# Patient Record
Sex: FEMALE | URBAN - METROPOLITAN AREA
[De-identification: names, ages, dates, MRNs, and addresses within clinical notes are randomized per-mention and may not be internally consistent; named-entity substitution may affect disease eponyms.]

---

## 2017-03-24 ENCOUNTER — APPOINTMENT (RX ONLY)
Dept: URBAN - METROPOLITAN AREA CLINIC 156 | Facility: CLINIC | Age: 42
Setting detail: DERMATOLOGY
End: 2017-03-24

## 2017-03-24 VITALS
HEART RATE: 81 BPM | SYSTOLIC BLOOD PRESSURE: 119 MMHG | DIASTOLIC BLOOD PRESSURE: 76 MMHG | RESPIRATION RATE: 16 BRPM | HEIGHT: 64 IN | WEIGHT: 200 LBS

## 2017-03-24 DIAGNOSIS — D22 MELANOCYTIC NEVI: ICD-10-CM

## 2017-03-24 DIAGNOSIS — L57.0 ACTINIC KERATOSIS: ICD-10-CM

## 2017-03-24 PROBLEM — D22.5 MELANOCYTIC NEVI OF TRUNK: Status: ACTIVE | Noted: 2017-03-24

## 2017-03-24 PROCEDURE — ? COUNSELING

## 2017-03-24 PROCEDURE — ? PHOTODYNAMIC THERAPY COUNSELING

## 2017-03-24 PROCEDURE — 99214 OFFICE O/P EST MOD 30 MIN: CPT

## 2017-03-24 PROCEDURE — ? OTHER

## 2017-03-24 ASSESSMENT — LOCATION SIMPLE DESCRIPTION DERM
LOCATION SIMPLE: LEFT CHEEK
LOCATION SIMPLE: UPPER BACK

## 2017-03-24 ASSESSMENT — LOCATION DETAILED DESCRIPTION DERM
LOCATION DETAILED: LEFT INFERIOR CENTRAL MALAR CHEEK
LOCATION DETAILED: SUPERIOR THORACIC SPINE

## 2017-03-24 ASSESSMENT — LOCATION ZONE DERM
LOCATION ZONE: TRUNK
LOCATION ZONE: FACE

## 2017-03-24 NOTE — PROCEDURE: OTHER
Detail Level: Zone
Other (Free Text): Sent message to Debby Zheng to precertify patient for PDT\\nIf PDT is not covered, patient will call and will plan to do Picato 0.15% (one area of the face at a time)
Note Text (......Xxx Chief Complaint.): This diagnosis correlates with the

## 2017-04-14 ENCOUNTER — APPOINTMENT (RX ONLY)
Dept: URBAN - METROPOLITAN AREA CLINIC 156 | Facility: CLINIC | Age: 42
Setting detail: DERMATOLOGY
End: 2017-04-14

## 2017-04-14 DIAGNOSIS — L57.0 ACTINIC KERATOSIS: ICD-10-CM

## 2017-04-14 PROCEDURE — ? PDT: BLUE

## 2017-04-14 PROCEDURE — ? PULSED-DYE LASER

## 2017-04-14 PROCEDURE — 96567 PDT DSTR PRMLG LES SKN: CPT

## 2017-04-14 ASSESSMENT — LOCATION SIMPLE DESCRIPTION DERM
LOCATION SIMPLE: SUPERIOR FOREHEAD
LOCATION SIMPLE: LEFT CHEEK
LOCATION SIMPLE: RIGHT CHEEK
LOCATION SIMPLE: CHIN
LOCATION SIMPLE: NOSE

## 2017-04-14 ASSESSMENT — LOCATION ZONE DERM
LOCATION ZONE: NOSE
LOCATION ZONE: FACE

## 2017-04-14 ASSESSMENT — LOCATION DETAILED DESCRIPTION DERM
LOCATION DETAILED: RIGHT CENTRAL MALAR CHEEK
LOCATION DETAILED: LEFT INFERIOR CENTRAL MALAR CHEEK
LOCATION DETAILED: NASAL DORSUM
LOCATION DETAILED: LEFT CHIN
LOCATION DETAILED: SUPERIOR MID FOREHEAD

## 2017-04-14 NOTE — PROCEDURE: PULSED-DYE LASER
Fluence: 8
Post-Procedure Care: Vaseline and ice applied. Post care reviewed with patient.
Pulse Count (Optional): 12
Fluence: 10
Post-Care Instructions: I reviewed with the patient in detail post-care instructions. Patient should stay away from the sun and wear sun protection until treated areas are fully healed.
Price (Use Numbers Only, No Special Characters Or $): 0
Pulse Duration: 10 ms
Treated Area: medium area
Detail Level: Zone
Delay Time (Ms): 20
Cryogen Time (Ms): 30
Cryogen Time (Ms): 30
Laser Type: Vbeam 595nm
Delay Time (Ms): 20
Location Override: Face
Immediate Endpoint: erythema
Consent: Written consent obtained, risks reviewed including but not limited to crusting, scabbing, blistering, scarring, darker or lighter pigmentary change, incidental hair removal, bruising, and/or incomplete removal.
Spot Size: 7 mm
Spot Size: 10 mm
Pulse Duration: 20 ms

## 2017-04-14 NOTE — PROCEDURE: PDT: BLUE
Treatment Number: 1
Pre-Procedure Text: The treatment areas were cleaned and prepped in the usual fashion. Microdermabrasion and acetone performed during prep.
Expiration Date (Optional): 11/19
Was Levulan/Ameluz Applied On A Previous Day?: No
Anesthesia Type: 1% lidocaine with epinephrine
Which Photosensitizer Was Used: Levulan
Post-Care Instructions: I reviewed with the patient in detail post-care instructions. Patient is to avoid sunlight for the next 2 days, and wear sun protection. Patients may expect sunburn like redness, discomfort and scabbing.
Detail Level: Zone
Consent: Written consent obtained.  The risks were reviewed with the patient including but not limited to: pigmentary changes, pain, blistering, scabbing, redness, and the remote possibility of scarring.
Ndc# (Optional): [id number]
Lot # (Optional): [id number]
Light Source: Yaakov-U
Total Number Of Aks Treated (Optional To Report): 0
Incubation Start Time: 9:00 am
Illumination Time: 00:16:40
Incubation Time: 01:30:00
Incubation End Time: 10:30 am

## 2017-05-12 ENCOUNTER — APPOINTMENT (RX ONLY)
Dept: URBAN - METROPOLITAN AREA CLINIC 156 | Facility: CLINIC | Age: 42
Setting detail: DERMATOLOGY
End: 2017-05-12

## 2017-05-12 DIAGNOSIS — L57.0 ACTINIC KERATOSIS: ICD-10-CM

## 2017-05-12 PROCEDURE — 96567 PDT DSTR PRMLG LES SKN: CPT

## 2017-05-12 PROCEDURE — ? PDT: BLUE

## 2017-05-12 ASSESSMENT — LOCATION SIMPLE DESCRIPTION DERM
LOCATION SIMPLE: INFERIOR FOREHEAD
LOCATION SIMPLE: NOSE
LOCATION SIMPLE: RIGHT LIP
LOCATION SIMPLE: LEFT CHEEK
LOCATION SIMPLE: RIGHT CHEEK

## 2017-05-12 ASSESSMENT — LOCATION ZONE DERM
LOCATION ZONE: NOSE
LOCATION ZONE: LIP
LOCATION ZONE: FACE

## 2017-05-12 ASSESSMENT — LOCATION DETAILED DESCRIPTION DERM
LOCATION DETAILED: NASAL DORSUM
LOCATION DETAILED: INFERIOR MID FOREHEAD
LOCATION DETAILED: RIGHT MEDIAL MALAR CHEEK
LOCATION DETAILED: RIGHT LOWER CUTANEOUS LIP
LOCATION DETAILED: LEFT INFERIOR MEDIAL MALAR CHEEK

## 2017-05-12 NOTE — PROCEDURE: PDT: BLUE
Post-Care Instructions: I reviewed with the patient in detail post-care instructions. Patient is to avoid sunlight for the next 2 days, and wear sun protection. Patients may expect sunburn like redness, discomfort and scabbing.
Which Photosensitizer Was Used: Levulan
Light Source: Yaakov-U
Pre-Procedure Text: The treatment areas were cleaned and prepped in the usual fashion.
Expiration Date (Optional): 12/19
Illumination Time: 00:16:40
Lot # (Optional): [id number]
Consent: Written consent obtained.  The risks were reviewed with the patient including but not limited to: pigmentary changes, pain, blistering, scabbing, redness, and the remote possibility of scarring.
Detail Level: Zone
Incubation End Time: 10:15
Anesthesia Type: 1% lidocaine with epinephrine
Was Levulan/Ameluz Applied On A Previous Day?: No
Number Of Kerasticks/Tubes Billed For: 1
Treatment Number: 2
Total Number Of Aks Treated (Optional To Report): 0
Incubation Time: 01:30
Incubation Start Time: 8:45

## 2017-09-15 ENCOUNTER — APPOINTMENT (RX ONLY)
Dept: URBAN - METROPOLITAN AREA CLINIC 156 | Facility: CLINIC | Age: 42
Setting detail: DERMATOLOGY
End: 2017-09-15

## 2017-09-15 DIAGNOSIS — L82.1 OTHER SEBORRHEIC KERATOSIS: ICD-10-CM

## 2017-09-15 DIAGNOSIS — Z87.2 PERSONAL HISTORY OF DISEASES OF THE SKIN AND SUBCUTANEOUS TISSUE: ICD-10-CM | Status: RESOLVED

## 2017-09-15 DIAGNOSIS — Z71.89 OTHER SPECIFIED COUNSELING: ICD-10-CM

## 2017-09-15 DIAGNOSIS — D22 MELANOCYTIC NEVI: ICD-10-CM

## 2017-09-15 PROBLEM — D22.39 MELANOCYTIC NEVI OF OTHER PARTS OF FACE: Status: ACTIVE | Noted: 2017-09-15

## 2017-09-15 PROCEDURE — ? SUNSCREEN RECOMMENDATIONS

## 2017-09-15 PROCEDURE — ? COUNSELING

## 2017-09-15 PROCEDURE — 99213 OFFICE O/P EST LOW 20 MIN: CPT

## 2017-09-15 ASSESSMENT — LOCATION DETAILED DESCRIPTION DERM
LOCATION DETAILED: RIGHT INFERIOR CENTRAL MALAR CHEEK
LOCATION DETAILED: RIGHT CENTRAL MALAR CHEEK
LOCATION DETAILED: LEFT VENTRAL PROXIMAL FOREARM
LOCATION DETAILED: LEFT INFERIOR CENTRAL MALAR CHEEK
LOCATION DETAILED: RIGHT VENTRAL PROXIMAL FOREARM

## 2017-09-15 ASSESSMENT — LOCATION SIMPLE DESCRIPTION DERM
LOCATION SIMPLE: RIGHT FOREARM
LOCATION SIMPLE: RIGHT CHEEK
LOCATION SIMPLE: LEFT CHEEK
LOCATION SIMPLE: LEFT FOREARM

## 2017-09-15 ASSESSMENT — LOCATION ZONE DERM
LOCATION ZONE: ARM
LOCATION ZONE: FACE

## 2020-11-09 ENCOUNTER — APPOINTMENT (RX ONLY)
Dept: URBAN - METROPOLITAN AREA CLINIC 156 | Facility: CLINIC | Age: 45
Setting detail: DERMATOLOGY
End: 2020-11-09

## 2020-11-09 DIAGNOSIS — L57.0 ACTINIC KERATOSIS: ICD-10-CM | Status: INADEQUATELY CONTROLLED

## 2020-11-09 DIAGNOSIS — D22 MELANOCYTIC NEVI: ICD-10-CM

## 2020-11-09 DIAGNOSIS — L57.8 OTHER SKIN CHANGES DUE TO CHRONIC EXPOSURE TO NONIONIZING RADIATION: ICD-10-CM

## 2020-11-09 DIAGNOSIS — Z71.89 OTHER SPECIFIED COUNSELING: ICD-10-CM

## 2020-11-09 DIAGNOSIS — I78.8 OTHER DISEASES OF CAPILLARIES: ICD-10-CM | Status: STABLE

## 2020-11-09 DIAGNOSIS — L82.0 INFLAMED SEBORRHEIC KERATOSIS: ICD-10-CM

## 2020-11-09 DIAGNOSIS — L82.1 OTHER SEBORRHEIC KERATOSIS: ICD-10-CM

## 2020-11-09 PROBLEM — D22.5 MELANOCYTIC NEVI OF TRUNK: Status: ACTIVE | Noted: 2020-11-09

## 2020-11-09 PROCEDURE — ? COUNSELING

## 2020-11-09 PROCEDURE — 17000 DESTRUCT PREMALG LESION: CPT | Mod: 59

## 2020-11-09 PROCEDURE — 17110 DESTRUCTION B9 LES UP TO 14: CPT

## 2020-11-09 PROCEDURE — 99203 OFFICE O/P NEW LOW 30 MIN: CPT | Mod: 25

## 2020-11-09 PROCEDURE — 17003 DESTRUCT PREMALG LES 2-14: CPT | Mod: 59

## 2020-11-09 PROCEDURE — ? PRESCRIPTION

## 2020-11-09 PROCEDURE — ? SUNSCREEN RECOMMENDATIONS

## 2020-11-09 PROCEDURE — ? LIQUID NITROGEN

## 2020-11-09 RX ORDER — INGENOL MEBUTATE 150 UG/G
GEL TOPICAL
Qty: 1 | Refills: 1 | Status: ERX | COMMUNITY
Start: 2020-11-09

## 2020-11-09 RX ADMIN — INGENOL MEBUTATE: 150 GEL TOPICAL at 00:00

## 2020-11-09 ASSESSMENT — LOCATION SIMPLE DESCRIPTION DERM
LOCATION SIMPLE: CHEST
LOCATION SIMPLE: RIGHT HAND
LOCATION SIMPLE: RIGHT UPPER BACK
LOCATION SIMPLE: LEFT UPPER BACK
LOCATION SIMPLE: LEFT PRETIBIAL REGION
LOCATION SIMPLE: LEFT CHEEK

## 2020-11-09 ASSESSMENT — LOCATION ZONE DERM
LOCATION ZONE: FACE
LOCATION ZONE: TRUNK
LOCATION ZONE: HAND
LOCATION ZONE: LEG

## 2020-11-09 ASSESSMENT — LOCATION DETAILED DESCRIPTION DERM
LOCATION DETAILED: LEFT INFERIOR MEDIAL MALAR CHEEK
LOCATION DETAILED: MIDDLE STERNUM
LOCATION DETAILED: RIGHT MID-UPPER BACK
LOCATION DETAILED: RIGHT RADIAL DORSAL HAND
LOCATION DETAILED: RIGHT LATERAL SUPERIOR CHEST
LOCATION DETAILED: LEFT LATERAL PROXIMAL PRETIBIAL REGION
LOCATION DETAILED: LEFT MEDIAL UPPER BACK
LOCATION DETAILED: RIGHT MEDIAL SUPERIOR CHEST

## 2021-07-15 ENCOUNTER — APPOINTMENT (RX ONLY)
Dept: URBAN - METROPOLITAN AREA CLINIC 154 | Facility: CLINIC | Age: 46
Setting detail: DERMATOLOGY
End: 2021-07-15

## 2021-07-15 DIAGNOSIS — Z41.9 ENCOUNTER FOR PROCEDURE FOR PURPOSES OTHER THAN REMEDYING HEALTH STATE, UNSPECIFIED: ICD-10-CM

## 2021-07-15 PROCEDURE — ? BOTOX

## 2021-07-15 PROCEDURE — ? HYDRAFACIAL

## 2021-07-15 ASSESSMENT — LOCATION ZONE DERM: LOCATION ZONE: FACE

## 2021-07-15 ASSESSMENT — LOCATION DETAILED DESCRIPTION DERM: LOCATION DETAILED: INFERIOR MID FOREHEAD

## 2021-07-15 ASSESSMENT — LOCATION SIMPLE DESCRIPTION DERM: LOCATION SIMPLE: INFERIOR FOREHEAD

## 2021-07-15 NOTE — PROCEDURE: BOTOX
Glabellar Complex Units: 20
Show Topical Anesthesia: Yes
Additional Area 3 Units: 0
Show Right And Left Brow Units: No
Expiration Date (Month Year): 8/24
Detail Level: Simple
Inferior Lateral Orbicularis Oculi Units: 15
Lot #: [id number]
Consent: Written consent obtained. Risks include but not limited to lid/brow ptosis, bruising, swelling, diplopia, temporary effect, incomplete chemical denervation.
Forehead Units: 10
Dilution (U/0.1 Cc): 2.5
Additional Area 1 Location: [facility]
Reconstitution Date (Optional): 7/13/21
Post-Care Instructions: Patient instructed to not lie down for 4 hours and limit physical activity for 24 hours. Patient instructed not to travel by airplane for 48 hours.

## 2021-07-15 NOTE — PROCEDURE: HYDRAFACIAL
Additional Vacuum Pressure (Won't Render If 0): 22
Number Of Passes: 2
Detail Level: Zone
Treatment Number: 1
Vacuum Pressure: 12
Consent: Written consent obtained, risks reviewed including but not limited to crusting, scabbing, blistering, scarring, darker or lighter pigmentary change, bruising, and/or incomplete response.
Indication: anti-aging
Additional Vacuum Pressure (Won't Render If 0): 17
Glycolic Acid %: 15%
Post-Care Instructions: I reviewed with the patient in detail post-care instructions. Patient should stay away from the sun and wear sun protection until treated areas are fully healed.

## 2021-10-05 ENCOUNTER — APPOINTMENT (RX ONLY)
Dept: URBAN - METROPOLITAN AREA CLINIC 154 | Facility: CLINIC | Age: 46
Setting detail: DERMATOLOGY
End: 2021-10-05

## 2021-10-07 ENCOUNTER — APPOINTMENT (RX ONLY)
Dept: URBAN - METROPOLITAN AREA CLINIC 154 | Facility: CLINIC | Age: 46
Setting detail: DERMATOLOGY
End: 2021-10-07

## 2021-10-07 DIAGNOSIS — Z41.9 ENCOUNTER FOR PROCEDURE FOR PURPOSES OTHER THAN REMEDYING HEALTH STATE, UNSPECIFIED: ICD-10-CM

## 2021-10-07 PROCEDURE — ? HYDRAFACIAL

## 2021-10-07 PROCEDURE — ? DERMAPLANE

## 2021-10-07 PROCEDURE — ? JEUVEAU

## 2021-10-07 ASSESSMENT — LOCATION DETAILED DESCRIPTION DERM
LOCATION DETAILED: LEFT INFERIOR CENTRAL MALAR CHEEK
LOCATION DETAILED: RIGHT SUPERIOR LATERAL NECK
LOCATION DETAILED: RIGHT INFERIOR CENTRAL MALAR CHEEK
LOCATION DETAILED: LEFT SUPERIOR LATERAL NECK

## 2021-10-07 ASSESSMENT — LOCATION SIMPLE DESCRIPTION DERM
LOCATION SIMPLE: RIGHT ANTERIOR NECK
LOCATION SIMPLE: LEFT CHEEK
LOCATION SIMPLE: RIGHT CHEEK
LOCATION SIMPLE: LEFT ANTERIOR NECK

## 2021-10-07 ASSESSMENT — LOCATION ZONE DERM
LOCATION ZONE: FACE
LOCATION ZONE: NECK

## 2021-10-07 NOTE — PROCEDURE: HYDRAFACIAL
Additional Vacuum Pressure (Won't Render If 0): 16
Consent: Written consent obtained, risks reviewed including but not limited to crusting, scabbing, blistering, scarring, darker or lighter pigmentary change, bruising, and/or incomplete response.
Detail Level: Zone
Indication: anti-aging
Vacuum Pressure: 18
Post-Care Instructions: I reviewed with the patient in detail post-care instructions. Patient should stay away from the sun and wear sun protection until treated areas are fully healed.
Additional Vacuum Pressure (Won't Render If 0): 12
Number Of Passes: 3
Glycolic Acid %: 7.5%

## 2021-10-07 NOTE — PROCEDURE: JEUVEAU
Show Additional Area 5: Yes
R Brow Units: 0
Glabellar Complex Units: 20
Masseter Units: 30
Show Right And Left Brow Units: No
Inferior Lateral Orbicularis Oculi Units: 15
Detail Level: Detailed
Consent: Written consent obtained. Risks include but not limited to lid/brow ptosis, bruising, swelling, diplopia, temporary effect, incomplete chemical denervation.
Dilution (U/0.1 Cc): 4
Post-Care Instructions: Patient instructed to not lie down for 4 hours and limit physical activity for 24 hours.

## 2021-10-07 NOTE — PROCEDURE: DERMAPLANE
Pre-Procedure Text: The patient was placed in a recumbant position on the procedure table.
Blade: 10 blade scalpel
Treatment Area Prep: acetone
Treatment Areas: face and neck
Detail Level: Zone
Post-Procedure Instructions: Following the dermaplane procedure, Moisturizer and SPF was applied.
Post-Care Instructions: I reviewed with the patient in detail post-care instructions.

## 2021-11-03 ENCOUNTER — APPOINTMENT (RX ONLY)
Dept: URBAN - METROPOLITAN AREA CLINIC 154 | Facility: CLINIC | Age: 46
Setting detail: DERMATOLOGY
End: 2021-11-03

## 2021-11-03 DIAGNOSIS — Z41.9 ENCOUNTER FOR PROCEDURE FOR PURPOSES OTHER THAN REMEDYING HEALTH STATE, UNSPECIFIED: ICD-10-CM

## 2021-11-03 PROCEDURE — ? HYDRAFACIAL

## 2021-11-03 PROCEDURE — ? DERMAPLANE

## 2021-11-03 ASSESSMENT — LOCATION ZONE DERM
LOCATION ZONE: FACE
LOCATION ZONE: NECK

## 2021-11-03 ASSESSMENT — LOCATION DETAILED DESCRIPTION DERM
LOCATION DETAILED: RIGHT INFERIOR MEDIAL MALAR CHEEK
LOCATION DETAILED: RIGHT SUPERIOR LATERAL NECK
LOCATION DETAILED: LEFT SUPERIOR LATERAL NECK
LOCATION DETAILED: LEFT INFERIOR CENTRAL MALAR CHEEK

## 2021-11-03 ASSESSMENT — LOCATION SIMPLE DESCRIPTION DERM
LOCATION SIMPLE: RIGHT ANTERIOR NECK
LOCATION SIMPLE: RIGHT CHEEK
LOCATION SIMPLE: LEFT ANTERIOR NECK
LOCATION SIMPLE: LEFT CHEEK

## 2021-11-03 NOTE — PROCEDURE: HYDRAFACIAL
Glycolic Acid %: 7.5%
Post-Care Instructions: I reviewed with the patient in detail post-care instructions. Patient should stay away from the sun and wear sun protection until treated areas are fully healed.
Vacuum Pressure: 18
Additional Vacuum Pressure (Won't Render If 0): 16
Consent: Written consent obtained, risks reviewed including but not limited to crusting, scabbing, blistering, scarring, darker or lighter pigmentary change, bruising, and/or incomplete response.
Additional Vacuum Pressure (Won't Render If 0): 13
Indication: anti-aging
Detail Level: Zone
Number Of Passes: 3

## 2021-11-03 NOTE — PROCEDURE: DERMAPLANE
Detail Level: Zone
Pre-Procedure Text: The patient was placed in a recumbant position on the procedure table.
Treatment Areas: face
Post-Procedure Instructions: Following the dermaplane procedure, Moisturizer and SPF was applied.
Blade: 10 blade scalpel
Treatment Area Prep: acetone
Post-Care Instructions: I reviewed with the patient in detail post-care instructions.

## 2021-11-24 ENCOUNTER — APPOINTMENT (RX ONLY)
Dept: URBAN - METROPOLITAN AREA CLINIC 154 | Facility: CLINIC | Age: 46
Setting detail: DERMATOLOGY
End: 2021-11-24

## 2021-11-24 DIAGNOSIS — Z41.9 ENCOUNTER FOR PROCEDURE FOR PURPOSES OTHER THAN REMEDYING HEALTH STATE, UNSPECIFIED: ICD-10-CM

## 2021-11-24 PROCEDURE — ? DERMAPLANE

## 2021-11-24 PROCEDURE — ? HYDRAFACIAL

## 2021-11-24 ASSESSMENT — LOCATION ZONE DERM
LOCATION ZONE: NECK
LOCATION ZONE: FACE

## 2021-11-24 ASSESSMENT — LOCATION SIMPLE DESCRIPTION DERM
LOCATION SIMPLE: LEFT CHEEK
LOCATION SIMPLE: RIGHT ANTERIOR NECK
LOCATION SIMPLE: RIGHT CHEEK
LOCATION SIMPLE: LEFT ANTERIOR NECK

## 2021-11-24 NOTE — PROCEDURE: HYDRAFACIAL
Indication: anti-aging
Tip: Hydropeel Tip, Blue
Vacuum Pressure Low Setting (Will Not Render If Set To 0): 18
Tip: Hydropeel Tip, Teal
Vacuum Pressure Low Setting (Will Not Render If Set To 0): 15
Vacuum Pressure High Setting (Will Not Render If Set To 0): 0
Consent: Written consent obtained, risks reviewed including but not limited to crusting, scabbing, blistering, scarring, darker or lighter pigmentary change, bruising, and/or incomplete response.
Solution Override
Procedure: Exfoliation
Post-Care Instructions: I reviewed with the patient in detail post-care instructions. Patient should stay away from the sun and wear sun protection until treated areas are fully healed.
Tip: Hydropeel Tip, Clear
Location: other
Procedure: Fusion
Solution: Beta-HD
Solution: Activ-4
Procedure: Peel
Location Override: face & neck
Tip Override
Vacuum Pressure Low Setting (Will Not Render If Set To 0): 12
Solution: GlySal 7.5%
Procedure: Extraction

## 2021-11-24 NOTE — PROCEDURE: DERMAPLANE
Post-Care Instructions: I reviewed with the patient in detail post-care instructions.
Blade: 10 blade scalpel
Treatment Area Prep: acetone
Pre-Procedure Text: The patient was placed in a recumbant position on the procedure table.
Detail Level: Zone
Post-Procedure Instructions: Following the dermaplane procedure, Moisturizer and SPF was applied.
Treatment Areas: face and neck

## 2021-12-09 ENCOUNTER — APPOINTMENT (RX ONLY)
Dept: URBAN - METROPOLITAN AREA CLINIC 154 | Facility: CLINIC | Age: 46
Setting detail: DERMATOLOGY
End: 2021-12-09

## 2021-12-09 DIAGNOSIS — Z41.9 ENCOUNTER FOR PROCEDURE FOR PURPOSES OTHER THAN REMEDYING HEALTH STATE, UNSPECIFIED: ICD-10-CM

## 2021-12-09 PROCEDURE — ? HYDRAFACIAL

## 2021-12-09 PROCEDURE — ? DERMAPLANE

## 2021-12-09 ASSESSMENT — LOCATION SIMPLE DESCRIPTION DERM
LOCATION SIMPLE: LEFT CHEEK
LOCATION SIMPLE: LEFT ANTERIOR NECK
LOCATION SIMPLE: RIGHT ANTERIOR NECK
LOCATION SIMPLE: RIGHT CHEEK

## 2021-12-09 ASSESSMENT — LOCATION DETAILED DESCRIPTION DERM
LOCATION DETAILED: RIGHT SUPERIOR LATERAL NECK
LOCATION DETAILED: LEFT INFERIOR CENTRAL MALAR CHEEK
LOCATION DETAILED: LEFT SUPERIOR LATERAL NECK
LOCATION DETAILED: RIGHT INFERIOR CENTRAL MALAR CHEEK

## 2021-12-09 ASSESSMENT — LOCATION ZONE DERM
LOCATION ZONE: NECK
LOCATION ZONE: FACE

## 2021-12-09 NOTE — PROCEDURE: HYDRAFACIAL
Vacuum Pressure High Setting (Will Not Render If Set To 0): 0
Solution Override
Vacuum Pressure Low Setting (Will Not Render If Set To 0): 12
Location: other
Procedure: Peel
Tip: Hydropeel Tip, Teal
Consent: Written consent obtained, risks reviewed including but not limited to crusting, scabbing, blistering, scarring, darker or lighter pigmentary change, bruising, and/or incomplete response.
Tip Override
Solution: Activ-4
Tip: Hydropeel Tip, Clear
Post-Care Instructions: I reviewed with the patient in detail post-care instructions. Patient should stay away from the sun and wear sun protection until treated areas are fully healed.
Vacuum Pressure Low Setting (Will Not Render If Set To 0): 15
Procedure: Extraction
Location Override: face & neck
Solution: GlySal 7.5%
Vacuum Pressure Low Setting (Will Not Render If Set To 0): 18
Solution: Beta-HD
Tip: Hydropeel Tip, Blue
Procedure: Fusion
Indication: anti-aging
Number Of Passes: 2
Procedure: Exfoliation

## 2021-12-09 NOTE — PROCEDURE: DERMAPLANE
Post-Care Instructions: I reviewed with the patient in detail post-care instructions.
Detail Level: Zone
Post-Procedure Instructions: Following the dermaplane procedure, Moisturizer and SPF was applied.
Treatment Areas: face and neck
Pre-Procedure Text: The patient was placed in a recumbant position on the procedure table.
Treatment Area Prep: acetone
Blade: 10 blade scalpel

## 2021-12-16 ENCOUNTER — APPOINTMENT (RX ONLY)
Dept: URBAN - METROPOLITAN AREA CLINIC 154 | Facility: CLINIC | Age: 46
Setting detail: DERMATOLOGY
End: 2021-12-16

## 2021-12-16 DIAGNOSIS — Z41.9 ENCOUNTER FOR PROCEDURE FOR PURPOSES OTHER THAN REMEDYING HEALTH STATE, UNSPECIFIED: ICD-10-CM

## 2021-12-16 PROCEDURE — ? CHEMICAL PEEL

## 2021-12-16 ASSESSMENT — LOCATION ZONE DERM: LOCATION ZONE: FACE

## 2021-12-16 ASSESSMENT — LOCATION SIMPLE DESCRIPTION DERM: LOCATION SIMPLE: RIGHT CHEEK

## 2021-12-16 ASSESSMENT — LOCATION DETAILED DESCRIPTION DERM: LOCATION DETAILED: RIGHT INFERIOR MEDIAL MALAR CHEEK

## 2021-12-16 NOTE — PROCEDURE: CHEMICAL PEEL
Consent: Prior to the procedure, written consent was obtained and risks were reviewed, including but not limited to: redness, peeling, blistering, pigmentary change, scarring, infection, and pain.
Number Of Layers: 3
Chemical Peel: TCA 33%
Detail Level: Zone
Post Peel Care: After the procedure, a post-peel cream was applied to the treated areas. Sun protection and post-care instructions were reviewed with the patient.
Treatment Time (Optional): 10
Treatment Number: 0
Post-Care Instructions: I reviewed with the patient in detail post-care instructions. Patient should avoid sun exposure and wear sun protection.
Prep: The treated area was degreased with pre-peel cleanser, and vaseline was applied for protection of mucous membranes.

## 2021-12-21 ENCOUNTER — APPOINTMENT (RX ONLY)
Dept: URBAN - METROPOLITAN AREA CLINIC 154 | Facility: CLINIC | Age: 46
Setting detail: DERMATOLOGY
End: 2021-12-21

## 2021-12-21 DIAGNOSIS — Z41.9 ENCOUNTER FOR PROCEDURE FOR PURPOSES OTHER THAN REMEDYING HEALTH STATE, UNSPECIFIED: ICD-10-CM

## 2021-12-21 PROCEDURE — ? BOTOX

## 2021-12-21 PROCEDURE — ? CHEMICAL PEEL

## 2021-12-21 ASSESSMENT — LOCATION ZONE DERM: LOCATION ZONE: FACE

## 2021-12-21 ASSESSMENT — LOCATION SIMPLE DESCRIPTION DERM: LOCATION SIMPLE: LEFT CHEEK

## 2021-12-21 ASSESSMENT — LOCATION DETAILED DESCRIPTION DERM: LOCATION DETAILED: LEFT INFERIOR MEDIAL MALAR CHEEK

## 2021-12-21 NOTE — PROCEDURE: CHEMICAL PEEL
Consent: Prior to the procedure, written consent was obtained and risks were reviewed, including but not limited to: redness, peeling, blistering, pigmentary change, scarring, infection, and pain.
Post Peel Care: After the procedure, a post-peel cream was applied to the treated areas. Sun protection and post-care instructions were reviewed with the patient.
Treatment Number: 0
Detail Level: Zone
Treatment Time (Optional): 10
Chemical Peel: TCA 10%
Number Of Layers: 3
Post-Care Instructions: I reviewed with the patient in detail post-care instructions. Patient should avoid sun exposure and wear sun protection.
Prep: The treated area was degreased with pre-peel cleanser, and vaseline was applied for protection of mucous membranes.

## 2021-12-21 NOTE — PROCEDURE: BOTOX
Show Additional Area 1: Yes
Inferior Lateral Orbicularis Oculi Units: 15
Periorbital Skin Units: 0
Lot #: [id number]
Expiration Date (Month Year): 06/23
Consent: Written consent obtained. Risks include but not limited to lid/brow ptosis, bruising, swelling, diplopia, temporary effect, incomplete chemical denervation.
Detail Level: Detailed
Show Right And Left Periorbital Units: No
Masseter Units: 22
Additional Area 1 Location: [facility]
Forehead Units: 5
Dilution (U/0.1 Cc): 4
Post-Care Instructions: Patient instructed to not lie down for 4 hours and limit physical activity for 24 hours.

## 2021-12-28 ENCOUNTER — APPOINTMENT (RX ONLY)
Dept: URBAN - METROPOLITAN AREA CLINIC 154 | Facility: CLINIC | Age: 46
Setting detail: DERMATOLOGY
End: 2021-12-28

## 2021-12-28 DIAGNOSIS — Z41.9 ENCOUNTER FOR PROCEDURE FOR PURPOSES OTHER THAN REMEDYING HEALTH STATE, UNSPECIFIED: ICD-10-CM

## 2021-12-28 PROCEDURE — ? CHEMICAL PEEL

## 2021-12-28 ASSESSMENT — LOCATION SIMPLE DESCRIPTION DERM: LOCATION SIMPLE: LEFT CHEEK

## 2021-12-28 ASSESSMENT — LOCATION ZONE DERM: LOCATION ZONE: FACE

## 2021-12-28 ASSESSMENT — LOCATION DETAILED DESCRIPTION DERM: LOCATION DETAILED: LEFT INFERIOR MEDIAL MALAR CHEEK

## 2021-12-28 NOTE — PROCEDURE: CHEMICAL PEEL
Treatment Time (Optional): 10
Consent: Prior to the procedure, written consent was obtained and risks were reviewed, including but not limited to: redness, peeling, blistering, pigmentary change, scarring, infection, and pain.
Chemical Peel: Skin Medica Rejuvenize
Prep: The treated area was degreased with pre-peel cleanser, and vaseline was applied for protection of mucous membranes.
Number Of Layers: 2
Post-Care Instructions: I reviewed with the patient in detail post-care instructions. Patient should avoid sun exposure and wear sun protection.
Post Peel Care: After the procedure, a post-peel cream was applied to the treated areas. Sun protection and post-care instructions were reviewed with the patient.
Treatment Number: 0
Detail Level: Zone

## 2022-01-04 ENCOUNTER — APPOINTMENT (RX ONLY)
Dept: URBAN - METROPOLITAN AREA CLINIC 154 | Facility: CLINIC | Age: 47
Setting detail: DERMATOLOGY
End: 2022-01-04

## 2022-01-04 VITALS
SYSTOLIC BLOOD PRESSURE: 120 MMHG | DIASTOLIC BLOOD PRESSURE: 90 MMHG | HEIGHT: 64 IN | WEIGHT: 189 LBS | HEART RATE: 75 BPM | OXYGEN SATURATION: 99 %

## 2022-01-04 DIAGNOSIS — Z41.9 ENCOUNTER FOR PROCEDURE FOR PURPOSES OTHER THAN REMEDYING HEALTH STATE, UNSPECIFIED: ICD-10-CM

## 2022-01-04 PROCEDURE — ? OTHER

## 2022-01-05 ENCOUNTER — APPOINTMENT (RX ONLY)
Dept: URBAN - METROPOLITAN AREA CLINIC 154 | Facility: CLINIC | Age: 47
Setting detail: DERMATOLOGY
End: 2022-01-05

## 2022-01-05 DIAGNOSIS — Z41.9 ENCOUNTER FOR PROCEDURE FOR PURPOSES OTHER THAN REMEDYING HEALTH STATE, UNSPECIFIED: ICD-10-CM

## 2022-01-05 PROCEDURE — ? CHEMICAL PEEL

## 2022-01-05 NOTE — PROCEDURE: CHEMICAL PEEL
Prep: The treated area was degreased with pre-peel cleanser, and vaseline was applied for protection of mucous membranes.
Post Peel Care: After the procedure, a post-peel cream was applied to the treated areas. Sun protection and post-care instructions were reviewed with the patient.
Treatment Number: 0
Treatment Time (Optional): 10
Chemical Peel: Vi
Detail Level: Zone
Number Of Layers: 2
Consent: Prior to the procedure, written consent was obtained and risks were reviewed, including but not limited to: redness, peeling, blistering, pigmentary change, scarring, infection, and pain.
Post-Care Instructions: I reviewed with the patient in detail post-care instructions. Patient should avoid sun exposure and wear sun protection.

## 2022-01-06 ENCOUNTER — RX ONLY (OUTPATIENT)
Age: 47
Setting detail: RX ONLY
End: 2022-01-06

## 2022-01-06 RX ORDER — LEVOTHYROXINE, LIOTHYRONINE 38; 9 UG/1; UG/1
TABLET ORAL
Qty: 60 | Refills: 0 | Status: ERX | COMMUNITY
Start: 2022-01-06

## 2022-01-06 RX ORDER — PROGESTERONE 100 %
POWDER (GRAM) MISCELLANEOUS
Qty: 60 | Refills: 0 | Status: ERX | COMMUNITY
Start: 2022-01-06

## 2022-01-06 RX ORDER — TESTOSTERONE CYPIONATE 200 MG/ML
INJECTION INTRAMUSCULAR
Qty: 1 | Refills: 0 | Status: ERX | COMMUNITY
Start: 2022-01-06

## 2022-01-18 ENCOUNTER — APPOINTMENT (RX ONLY)
Dept: URBAN - METROPOLITAN AREA CLINIC 154 | Facility: CLINIC | Age: 47
Setting detail: DERMATOLOGY
End: 2022-01-18

## 2022-01-18 DIAGNOSIS — Z41.9 ENCOUNTER FOR PROCEDURE FOR PURPOSES OTHER THAN REMEDYING HEALTH STATE, UNSPECIFIED: ICD-10-CM

## 2022-01-18 PROCEDURE — ? HYDRAFACIAL

## 2022-01-18 PROCEDURE — ? BOTOX

## 2022-01-18 PROCEDURE — ? DERMAPLANE

## 2022-01-18 ASSESSMENT — LOCATION DETAILED DESCRIPTION DERM
LOCATION DETAILED: LEFT INFERIOR CENTRAL MALAR CHEEK
LOCATION DETAILED: RIGHT SUPERIOR LATERAL NECK
LOCATION DETAILED: LEFT SUPERIOR LATERAL NECK
LOCATION DETAILED: RIGHT INFERIOR CENTRAL MALAR CHEEK

## 2022-01-18 ASSESSMENT — LOCATION SIMPLE DESCRIPTION DERM
LOCATION SIMPLE: LEFT ANTERIOR NECK
LOCATION SIMPLE: RIGHT CHEEK
LOCATION SIMPLE: RIGHT ANTERIOR NECK
LOCATION SIMPLE: LEFT CHEEK

## 2022-01-18 ASSESSMENT — LOCATION ZONE DERM
LOCATION ZONE: FACE
LOCATION ZONE: NECK

## 2022-01-18 NOTE — PROCEDURE: HYDRAFACIAL
Vacuum Pressure Low Setting (Will Not Render If Set To 0): 0
Solution Override
Tip: Hydropeel Tip, Blue
Consent: Written consent obtained, risks reviewed including but not limited to crusting, scabbing, blistering, scarring, darker or lighter pigmentary change, bruising, and/or incomplete response.
Tip Override
Procedure: Exfoliation
Procedure: Fusion
Location: other
Vacuum Pressure Low Setting (Will Not Render If Set To 0): 22
Solution: Beta-HD
Number Of Passes: 1
Vacuum Pressure Low Setting (Will Not Render If Set To 0): 22
Solution: GlySal 15%
Solution: Activ-4
Tip: Hydropeel Tip, Clear
Post-Care Instructions: I reviewed with the patient in detail post-care instructions. Patient should stay away from the sun and wear sun protection until treated areas are fully healed.
Procedure: Extraction
Location Override: face and neck
Vacuum Pressure Low Setting (Will Not Render If Set To 0): 13
Procedure: Peel
Tip: Hydropeel Tip, Teal
Vacuum Pressure Low Setting (Will Not Render If Set To 0): 12
Number Of Passes: 2
Indication: skin texture

## 2022-01-18 NOTE — PROCEDURE: DERMAPLANE
Post-Procedure Instructions: Following the dermaplane procedure, Moisturizer and SPF was applied.
Blade: 10 blade scalpel
Post-Care Instructions: I reviewed with the patient in detail post-care instructions.
Treatment Area Prep: acetone
Treatment Areas: face and neck
Pre-Procedure Text: The patient was placed in a recumbant position on the procedure table.
Detail Level: Zone

## 2022-01-18 NOTE — PROCEDURE: BOTOX
Expiration Date (Month Year): 06/23
Show Lcl Units: No
L Brow Units: 0
Show Additional Area 4: Yes
Detail Level: Detailed
Consent: Written consent obtained. Risks include but not limited to lid/brow ptosis, bruising, swelling, diplopia, temporary effect, incomplete chemical denervation.
Dilution (U/0.1 Cc): 4
Post-Care Instructions: Patient instructed to not lie down for 4 hours and limit physical activity for 24 hours.
Levator Labii Superioris Units: 5
Nasal Root Units: 2
Forehead Units: 3
Lot #: [id number]
Additional Area 1 Location: [facility]

## 2022-02-15 ENCOUNTER — APPOINTMENT (RX ONLY)
Dept: URBAN - METROPOLITAN AREA CLINIC 154 | Facility: CLINIC | Age: 47
Setting detail: DERMATOLOGY
End: 2022-02-15

## 2022-02-15 DIAGNOSIS — Z41.9 ENCOUNTER FOR PROCEDURE FOR PURPOSES OTHER THAN REMEDYING HEALTH STATE, UNSPECIFIED: ICD-10-CM

## 2022-02-15 PROCEDURE — ? DERMAPLANE

## 2022-02-15 PROCEDURE — ? HYDRAFACIAL

## 2022-02-15 ASSESSMENT — LOCATION SIMPLE DESCRIPTION DERM
LOCATION SIMPLE: LEFT CHEEK
LOCATION SIMPLE: LEFT ANTERIOR NECK

## 2022-02-15 ASSESSMENT — LOCATION DETAILED DESCRIPTION DERM
LOCATION DETAILED: LEFT SUPERIOR LATERAL NECK
LOCATION DETAILED: LEFT INFERIOR CENTRAL MALAR CHEEK

## 2022-02-15 ASSESSMENT — LOCATION ZONE DERM
LOCATION ZONE: NECK
LOCATION ZONE: FACE

## 2022-02-15 NOTE — PROCEDURE: HYDRAFACIAL
Procedure: Peel
Consent: Written consent obtained, risks reviewed including but not limited to crusting, scabbing, blistering, scarring, darker or lighter pigmentary change, bruising, and/or incomplete response.
Tip Override
Vacuum Pressure Low Setting (Will Not Render If Set To 0): 0
Solution Override
Number Of Passes: 1
Vacuum Pressure Low Setting (Will Not Render If Set To 0): 13
Post-Care Instructions: I reviewed with the patient in detail post-care instructions. Patient should stay away from the sun and wear sun protection until treated areas are fully healed.
Tip: Hydropeel Tip, Blue
Vacuum Pressure Low Setting (Will Not Render If Set To 0): 12
Indication: skin texture
Tip: Hydropeel Tip, Teal
Solution: GlySal 15%
Procedure: Exfoliation
Location: face
Procedure: Extraction
Vacuum Pressure Low Setting (Will Not Render If Set To 0): 22
Number Of Passes: 2
Tip: Hydropeel Tip, Clear
Vacuum Pressure Low Setting (Will Not Render If Set To 0): 13
Solution: Beta-HD
Tip Override: Lip Perk Hydropeel Tip
Solution Override: Lip Perk Solution
Procedure: Fusion
Vacuum Pressure Low Setting (Will Not Render If Set To 0): 22
Solution: Activ-4

## 2022-02-22 ENCOUNTER — APPOINTMENT (RX ONLY)
Dept: URBAN - METROPOLITAN AREA CLINIC 154 | Facility: CLINIC | Age: 47
Setting detail: DERMATOLOGY
End: 2022-02-22

## 2022-02-22 VITALS
SYSTOLIC BLOOD PRESSURE: 140 MMHG | DIASTOLIC BLOOD PRESSURE: 84 MMHG | HEIGHT: 64 IN | HEART RATE: 86 BPM | WEIGHT: 178 LBS | OXYGEN SATURATION: 97 %

## 2022-02-22 DIAGNOSIS — Z41.9 ENCOUNTER FOR PROCEDURE FOR PURPOSES OTHER THAN REMEDYING HEALTH STATE, UNSPECIFIED: ICD-10-CM

## 2022-02-22 PROCEDURE — ? OTHER

## 2022-02-25 ENCOUNTER — RX ONLY (OUTPATIENT)
Age: 47
Setting detail: RX ONLY
End: 2022-02-25

## 2022-02-25 RX ORDER — TESTOSTERONE CYPIONATE 200 MG/ML
INJECTION INTRAMUSCULAR
Qty: 1 | Refills: 0 | Status: ERX

## 2022-02-25 RX ORDER — PROGESTERONE 100 %
POWDER (GRAM) MISCELLANEOUS
Qty: 90 | Refills: 0 | Status: ERX

## 2022-02-25 RX ORDER — LEVOTHYROXINE, LIOTHYRONINE 57; 13.5 UG/1; UG/1
TABLET ORAL
Qty: 90 | Refills: 0 | Status: ERX | COMMUNITY
Start: 2022-02-25

## 2022-03-15 ENCOUNTER — APPOINTMENT (RX ONLY)
Dept: URBAN - METROPOLITAN AREA CLINIC 154 | Facility: CLINIC | Age: 47
Setting detail: DERMATOLOGY
End: 2022-03-15

## 2022-03-15 DIAGNOSIS — Z41.9 ENCOUNTER FOR PROCEDURE FOR PURPOSES OTHER THAN REMEDYING HEALTH STATE, UNSPECIFIED: ICD-10-CM

## 2022-03-15 PROCEDURE — ? DERMAPLANE

## 2022-03-15 PROCEDURE — ? HYDRAFACIAL

## 2022-03-15 PROCEDURE — ? DYSPORT

## 2022-03-15 ASSESSMENT — LOCATION SIMPLE DESCRIPTION DERM
LOCATION SIMPLE: RIGHT CHEEK
LOCATION SIMPLE: LEFT ANTERIOR NECK
LOCATION SIMPLE: RIGHT ANTERIOR NECK
LOCATION SIMPLE: LEFT CHEEK

## 2022-03-15 ASSESSMENT — LOCATION DETAILED DESCRIPTION DERM
LOCATION DETAILED: LEFT SUPERIOR LATERAL NECK
LOCATION DETAILED: LEFT INFERIOR CENTRAL MALAR CHEEK
LOCATION DETAILED: RIGHT SUPERIOR MEDIAL BUCCAL CHEEK
LOCATION DETAILED: RIGHT SUPERIOR LATERAL NECK

## 2022-03-15 ASSESSMENT — LOCATION ZONE DERM
LOCATION ZONE: FACE
LOCATION ZONE: NECK

## 2022-03-15 NOTE — PROCEDURE: DERMAPLANE
Detail Level: Zone
Treatment Areas: face and neck
Post-Procedure Instructions: Following the dermaplane procedure, Moisturizer and SPF was applied.
Post-Care Instructions: I reviewed with the patient in detail post-care instructions.
Blade: 10 blade scalpel
Treatment Area Prep: acetone
Pre-Procedure Text: The patient was placed in a recumbant position on the procedure table.

## 2022-03-15 NOTE — PROCEDURE: DYSPORT
Show Additional Area 1: Yes
yes
Anterior Platysmal Bands Units: 0
Inferior Lateral Orbicularis Oculi Units: 40
Lot #: [id number]
Show Mentalis Units: No
Post-Care Instructions: Patient instructed to not lie down for 4 hours and limit physical activity for 24 hours.
Depressor Anguli Oris Units: 15
Detail Level: Detailed
Expiration Date (Month Year): 12/22
Consent: Written consent obtained. Risks include but not limited to lid/brow ptosis, bruising, swelling, diplopia, temporary effect, incomplete chemical denervation.
Masseter Units: 75
Forehead Units: 22.5
Glabellar Complex Units: 35
Dilution (U/ 0.1cc): 4

## 2022-03-15 NOTE — PROCEDURE: HYDRAFACIAL
Tip Override: Lip Perk Hydropeel Tip
Number Of Passes: 0
Tip: Hydropeel Tip, Blue
Solution: Beta-HD
Indication: skin texture
Tip: Hydropeel Tip, Clear
Tip: Hydropeel Tip, Teal
Vacuum Pressure Low Setting (Will Not Render If Set To 0): 22
Procedure: Fusion
Procedure: Exfoliation
Vacuum Pressure Low Setting (Will Not Render If Set To 0): 13
Solution Override
Procedure: Peel
Number Of Passes: 1
Vacuum Pressure Low Setting (Will Not Render If Set To 0): 13
Tip Override
Location: face
Solution: GlySal 15%
Solution: Activ-4
Solution Override: Lip Perk Solution
Consent: Written consent obtained, risks reviewed including but not limited to crusting, scabbing, blistering, scarring, darker or lighter pigmentary change, bruising, and/or incomplete response.
Post-Care Instructions: I reviewed with the patient in detail post-care instructions. Patient should stay away from the sun and wear sun protection until treated areas are fully healed.
Procedure: Extraction
Vacuum Pressure Low Setting (Will Not Render If Set To 0): 22
Vacuum Pressure Low Setting (Will Not Render If Set To 0): 12
Number Of Passes: 2

## 2022-04-12 ENCOUNTER — APPOINTMENT (RX ONLY)
Dept: URBAN - METROPOLITAN AREA CLINIC 154 | Facility: CLINIC | Age: 47
Setting detail: DERMATOLOGY
End: 2022-04-12

## 2022-04-12 DIAGNOSIS — Z41.9 ENCOUNTER FOR PROCEDURE FOR PURPOSES OTHER THAN REMEDYING HEALTH STATE, UNSPECIFIED: ICD-10-CM

## 2022-04-12 PROCEDURE — ? SCITON BBL

## 2022-04-12 PROCEDURE — ? HYDRAFACIAL

## 2022-04-12 PROCEDURE — ? DERMAPLANE

## 2022-04-12 ASSESSMENT — LOCATION ZONE DERM
LOCATION ZONE: NECK
LOCATION ZONE: FACE

## 2022-04-12 ASSESSMENT — LOCATION SIMPLE DESCRIPTION DERM
LOCATION SIMPLE: RIGHT CHEEK
LOCATION SIMPLE: RIGHT EYEBROW
LOCATION SIMPLE: LEFT ANTERIOR NECK
LOCATION SIMPLE: LEFT CHEEK
LOCATION SIMPLE: RIGHT ANTERIOR NECK
LOCATION SIMPLE: RIGHT FOREHEAD

## 2022-04-12 ASSESSMENT — LOCATION DETAILED DESCRIPTION DERM
LOCATION DETAILED: RIGHT INFERIOR MEDIAL FOREHEAD
LOCATION DETAILED: RIGHT SUPERIOR ANTERIOR NECK
LOCATION DETAILED: RIGHT INFERIOR CENTRAL MALAR CHEEK
LOCATION DETAILED: LEFT INFERIOR CENTRAL MALAR CHEEK
LOCATION DETAILED: RIGHT CENTRAL EYEBROW
LOCATION DETAILED: LEFT SUPERIOR LATERAL NECK

## 2022-04-12 NOTE — PROCEDURE: HYDRAFACIAL
Location: face
Post-Care Instructions: I reviewed with the patient in detail post-care instructions. Patient should stay away from the sun and wear sun protection until treated areas are fully healed.
Number Of Passes: 0
Solution: Activ-4
Number Of Passes: 1
Number Of Passes: 2
Tip Override: Lip Perk Hydropeel Tip
Vacuum Pressure Low Setting (Will Not Render If Set To 0): 13
Location Override: face & neck
Solution Override
Solution: Beta-HD
Tip Override
Tip: Hydropeel Tip, Teal
Tip: Hydropeel Tip, Clear
Indication: skin texture
Procedure: Fusion
Procedure: Peel
Vacuum Pressure Low Setting (Will Not Render If Set To 0): 22
Vacuum Pressure Low Setting (Will Not Render If Set To 0): 13
Solution Override: Lip Perk Solution
Tip: Hydropeel Tip, Blue
Procedure: Exfoliation
Solution: GlySal 15%
Vacuum Pressure Low Setting (Will Not Render If Set To 0): 22
Procedure: Extraction
Consent: Written consent obtained, risks reviewed including but not limited to crusting, scabbing, blistering, scarring, darker or lighter pigmentary change, bruising, and/or incomplete response.
Vacuum Pressure Low Setting (Will Not Render If Set To 0): 12

## 2022-04-12 NOTE — PROCEDURE: DERMAPLANE
Post-Care Instructions: I reviewed with the patient in detail post-care instructions.
Pre-Procedure Text: The patient was placed in a recumbant position on the procedure table.
Blade: 10 blade scalpel
Treatment Area Prep: acetone
Post-Procedure Instructions: Following the dermaplane procedure, Moisturizer and SPF was applied.
Detail Level: Zone
Treatment Areas: face and neck

## 2022-04-12 NOTE — PROCEDURE: SCITON BBL
Pulse Duration: 20
Fluence (J/Cm2): 25
Passes: 1
Detail Level: Zone
Pulse Duration: 20
Anesthesia Volume In Cc: 0
Pulse Duration Units: milliseconds
Pulse Duration: 19
Preprocedure Text: The treatment areas were thoroughly cleaned. Any exposed at risk hair that was not to be treated was covered in white paper tape. Clear ultrasound gel was applied to the treatment area. The area was treated with no immediate stacking of pulses.
Spot Size: Finesse Adapter Size: 15 x 15 mm square
Passes: 2
Post Procedure Text: The patient tolerated the procedure well. Ice-chilled washclothes were applied to the treatment area for comfort. Post care was reviewed with the patient.
Consent: Written consent obtained, risks reviewed including but not limited to crusting, scabbing, blistering, scarring, darker or lighter pigmentary change, bruising, and/or incomplete response.
Cooling (In C): 15
Cooling ?: Yes
Post-Care Instructions: I reviewed with the patient in detail post-care instructions. Patient should stay away from the sun and wear sun protection until treated areas are fully healed.
Hide Repetition Rate?: No
Location Override (Will Not Show Above If Text Entered): forehead and below the eyebrow
Spot Size: Finesse Adapter Size: 7 mm round
Fluence (J/Cm2): 22

## 2022-05-10 ENCOUNTER — APPOINTMENT (RX ONLY)
Dept: URBAN - METROPOLITAN AREA CLINIC 154 | Facility: CLINIC | Age: 47
Setting detail: DERMATOLOGY
End: 2022-05-10

## 2022-05-10 DIAGNOSIS — Z41.9 ENCOUNTER FOR PROCEDURE FOR PURPOSES OTHER THAN REMEDYING HEALTH STATE, UNSPECIFIED: ICD-10-CM

## 2022-05-10 PROCEDURE — ? HYDRAFACIAL

## 2022-05-10 PROCEDURE — ? DERMAPLANE

## 2022-05-10 ASSESSMENT — LOCATION ZONE DERM
LOCATION ZONE: NECK
LOCATION ZONE: FACE

## 2022-05-10 ASSESSMENT — LOCATION SIMPLE DESCRIPTION DERM
LOCATION SIMPLE: LEFT CHEEK
LOCATION SIMPLE: RIGHT CHEEK
LOCATION SIMPLE: LEFT ANTERIOR NECK
LOCATION SIMPLE: RIGHT ANTERIOR NECK

## 2022-05-10 ASSESSMENT — LOCATION DETAILED DESCRIPTION DERM
LOCATION DETAILED: LEFT SUPERIOR LATERAL NECK
LOCATION DETAILED: RIGHT SUPERIOR LATERAL NECK
LOCATION DETAILED: LEFT INFERIOR CENTRAL MALAR CHEEK
LOCATION DETAILED: RIGHT INFERIOR MEDIAL MALAR CHEEK

## 2022-05-10 NOTE — PROCEDURE: DERMAPLANE
Treatment Areas: face and neck
Pre-Procedure Text: The patient was placed in a recumbant position on the procedure table.
Detail Level: Zone
Post-Procedure Instructions: Following the dermaplane procedure, Moisturizer and SPF was applied.
Blade: 10 blade scalpel
Treatment Area Prep: acetone
Post-Care Instructions: I reviewed with the patient in detail post-care instructions.

## 2022-05-10 NOTE — PROCEDURE: HYDRAFACIAL
Number Of Passes: 0
Vacuum Pressure Low Setting (Will Not Render If Set To 0): 13
Procedure: Peel
Procedure: Extraction
Consent: Written consent obtained, risks reviewed including but not limited to crusting, scabbing, blistering, scarring, darker or lighter pigmentary change, bruising, and/or incomplete response.
Vacuum Pressure Low Setting (Will Not Render If Set To 0): 12
Vacuum Pressure Low Setting (Will Not Render If Set To 0): 22
Solution Override
Tip Override
Post-Care Instructions: I reviewed with the patient in detail post-care instructions. Patient should stay away from the sun and wear sun protection until treated areas are fully healed.
Number Of Passes: 1
Solution: GlySal 15%
Indication: anti-aging
Solution: Beta-HD
Tip: Hydropeel Tip, Clear
Tip: Hydropeel Tip, Blue
Procedure: Exfoliation
Procedure: Fusion
Vacuum Pressure Low Setting (Will Not Render If Set To 0): 22
Tip Override: Lip Perk Hydropeel Tip
Location: face
Number Of Passes: 2
Vacuum Pressure Low Setting (Will Not Render If Set To 0): 13
Tip: Hydropeel Tip, Teal
Solution: Activ-4
Solution Override: Lip Perk Solution

## 2022-05-24 ENCOUNTER — APPOINTMENT (RX ONLY)
Dept: URBAN - METROPOLITAN AREA CLINIC 154 | Facility: CLINIC | Age: 47
Setting detail: DERMATOLOGY
End: 2022-05-24

## 2022-05-24 VITALS
HEIGHT: 64 IN | SYSTOLIC BLOOD PRESSURE: 140 MMHG | OXYGEN SATURATION: 99 % | WEIGHT: 163 LBS | DIASTOLIC BLOOD PRESSURE: 82 MMHG | HEART RATE: 95 BPM

## 2022-05-24 DIAGNOSIS — Z41.9 ENCOUNTER FOR PROCEDURE FOR PURPOSES OTHER THAN REMEDYING HEALTH STATE, UNSPECIFIED: ICD-10-CM

## 2022-05-24 PROCEDURE — ? OTHER

## 2022-05-25 ENCOUNTER — RX ONLY (OUTPATIENT)
Age: 47
Setting detail: RX ONLY
End: 2022-05-25

## 2022-05-25 RX ORDER — LEVOTHYROXINE, LIOTHYRONINE 57; 13.5 UG/1; UG/1
TABLET ORAL
Qty: 90 | Refills: 0 | Status: ERX

## 2022-05-27 ENCOUNTER — RX ONLY (OUTPATIENT)
Age: 47
Setting detail: RX ONLY
End: 2022-05-27

## 2022-05-27 RX ORDER — PROGESTERONE 100 %
POWDER (GRAM) MISCELLANEOUS
Qty: 90 | Refills: 0 | Status: ERX

## 2022-06-07 ENCOUNTER — APPOINTMENT (RX ONLY)
Dept: URBAN - METROPOLITAN AREA CLINIC 154 | Facility: CLINIC | Age: 47
Setting detail: DERMATOLOGY
End: 2022-06-07

## 2022-06-07 DIAGNOSIS — Z41.9 ENCOUNTER FOR PROCEDURE FOR PURPOSES OTHER THAN REMEDYING HEALTH STATE, UNSPECIFIED: ICD-10-CM

## 2022-06-07 PROCEDURE — ? DERMAPLANE

## 2022-06-07 PROCEDURE — ? HYDRAFACIAL

## 2022-06-07 ASSESSMENT — LOCATION ZONE DERM
LOCATION ZONE: NECK
LOCATION ZONE: FACE

## 2022-06-07 ASSESSMENT — LOCATION DETAILED DESCRIPTION DERM
LOCATION DETAILED: LEFT SUPERIOR LATERAL NECK
LOCATION DETAILED: LEFT INFERIOR CENTRAL MALAR CHEEK

## 2022-06-07 ASSESSMENT — LOCATION SIMPLE DESCRIPTION DERM
LOCATION SIMPLE: LEFT CHEEK
LOCATION SIMPLE: LEFT ANTERIOR NECK

## 2022-06-07 NOTE — PROCEDURE: DERMAPLANE
Treatment Area Prep: acetone
Detail Level: Zone
Post-Procedure Instructions: Following the dermaplane procedure, Moisturizer and SPF was applied.
Pre-Procedure Text: The patient was placed in a recumbant position on the procedure table.
Post-Care Instructions: I reviewed with the patient in detail post-care instructions.
Treatment Areas: face and neck
Blade: 10 blade scalpel

## 2022-06-07 NOTE — PROCEDURE: HYDRAFACIAL
Tip Override
Solution Override
Vacuum Pressure High Setting (Will Not Render If Set To 0): 0
Tip: Hydropeel Tip, Blue
Consent: Written consent obtained, risks reviewed including but not limited to crusting, scabbing, blistering, scarring, darker or lighter pigmentary change, bruising, and/or incomplete response.
Vacuum Pressure Low Setting (Will Not Render If Set To 0): 12
Procedure: Extraction
Tip: Hydropeel Tip, Teal
Post-Care Instructions: I reviewed with the patient in detail post-care instructions. Patient should stay away from the sun and wear sun protection until treated areas are fully healed.
Number Of Passes: 2
Tip Override: Lip Perk Hydropeel Tip
Indication: skin texture
Procedure: Exfoliation
Number Of Passes: 1
Solution: Beta-HD
Location: face
Tip: Hydropeel Tip, Clear
Solution: Activ-4
Vacuum Pressure Low Setting (Will Not Render If Set To 0): 13
Procedure: Peel
Vacuum Pressure Low Setting (Will Not Render If Set To 0): 13
Vacuum Pressure Low Setting (Will Not Render If Set To 0): 22
Procedure: Fusion
Vacuum Pressure Low Setting (Will Not Render If Set To 0): 22
Location Override: face and neck
Solution Override: Lip Perk Solution
Solution: GlySal 15%

## 2022-06-19 NOTE — PROCEDURE: LIQUID NITROGEN
Post-Care Instructions: I reviewed with the patient in detail post-care instructions. Patient is to wear sunprotection, and avoid picking at any of the treated lesions. Pt may apply Vaseline to crusted or scabbing areas.
Medical Necessity Clause: This procedure was medically necessary because the lesions that were treated were: painful
Render Post-Care Instructions In Note?: no
Medical Necessity Information: It is in your best interest to select a reason for this procedure from the list below. All of these items fulfill various CMS LCD requirements except the new and changing color options.
Detail Level: Simple
Consent: The patient's consent was obtained including but not limited to risks of crusting, scabbing, blistering, scarring, darker or lighter pigmentary change, recurrence, incomplete removal and infection.
Duration Of Freeze Thaw-Cycle (Seconds): 0
Detail Level: Detailed
The patient reports using cannabis once monthly.  Last use on 06/15/22

## 2022-07-12 ENCOUNTER — APPOINTMENT (RX ONLY)
Dept: URBAN - METROPOLITAN AREA CLINIC 154 | Facility: CLINIC | Age: 47
Setting detail: DERMATOLOGY
End: 2022-07-12

## 2022-07-12 DIAGNOSIS — Z41.9 ENCOUNTER FOR PROCEDURE FOR PURPOSES OTHER THAN REMEDYING HEALTH STATE, UNSPECIFIED: ICD-10-CM

## 2022-07-12 PROCEDURE — ? HYDRAFACIAL

## 2022-07-12 PROCEDURE — ? DERMAPLANE

## 2022-07-12 ASSESSMENT — LOCATION ZONE DERM
LOCATION ZONE: FACE
LOCATION ZONE: NECK

## 2022-07-12 ASSESSMENT — LOCATION DETAILED DESCRIPTION DERM
LOCATION DETAILED: LEFT INFERIOR CENTRAL MALAR CHEEK
LOCATION DETAILED: LEFT SUPERIOR LATERAL NECK

## 2022-07-12 ASSESSMENT — LOCATION SIMPLE DESCRIPTION DERM
LOCATION SIMPLE: LEFT ANTERIOR NECK
LOCATION SIMPLE: LEFT CHEEK

## 2022-07-12 NOTE — PROCEDURE: HYDRAFACIAL
Number Of Passes: 2
Tip Override
Vacuum Pressure High Setting (Will Not Render If Set To 0): 0
Procedure: Exfoliation
Tip: Hydropeel Tip, Teal
Solution Override
Location: face
Solution: Beta-HD
Solution: Activ-4
Vacuum Pressure Low Setting (Will Not Render If Set To 0): 12
Number Of Passes: 1
Vacuum Pressure Low Setting (Will Not Render If Set To 0): 13
Procedure: Peel
Tip Override: Lip Perk Hydropeel Tip
Solution: GlySal 15%
Vacuum Pressure Low Setting (Will Not Render If Set To 0): 13
Tip: Hydropeel Tip, Clear
Indication: anti-aging
Vacuum Pressure Low Setting (Will Not Render If Set To 0): 22
Procedure: Fusion
Tip: Hydropeel Tip, Blue
Solution Override: Lip Perk Solution
Vacuum Pressure Low Setting (Will Not Render If Set To 0): 22
Procedure: Extraction
Post-Care Instructions: I reviewed with the patient in detail post-care instructions. Patient should stay away from the sun and wear sun protection until treated areas are fully healed.
Consent: Written consent obtained, risks reviewed including but not limited to crusting, scabbing, blistering, scarring, darker or lighter pigmentary change, bruising, and/or incomplete response.

## 2022-07-12 NOTE — PROCEDURE: DERMAPLANE
Post-Care Instructions: I reviewed with the patient in detail post-care instructions.
Pre-Procedure Text: The patient was placed in a recumbant position on the procedure table.
Detail Level: Zone
Treatment Areas: face
Post-Procedure Instructions: Following the dermaplane procedure, Moisturizer and SPF was applied.
Treatment Area Prep: acetone
Blade: 10 blade scalpel

## 2022-08-09 ENCOUNTER — APPOINTMENT (RX ONLY)
Dept: URBAN - METROPOLITAN AREA CLINIC 154 | Facility: CLINIC | Age: 47
Setting detail: DERMATOLOGY
End: 2022-08-09

## 2022-08-09 DIAGNOSIS — Z41.9 ENCOUNTER FOR PROCEDURE FOR PURPOSES OTHER THAN REMEDYING HEALTH STATE, UNSPECIFIED: ICD-10-CM

## 2022-08-09 PROCEDURE — ? HYDRAFACIAL

## 2022-08-09 PROCEDURE — ? BOTOX

## 2022-08-09 PROCEDURE — ? DERMAPLANE

## 2022-08-09 ASSESSMENT — LOCATION SIMPLE DESCRIPTION DERM
LOCATION SIMPLE: RIGHT ANTERIOR NECK
LOCATION SIMPLE: LEFT CHEEK
LOCATION SIMPLE: LEFT ANTERIOR NECK
LOCATION SIMPLE: RIGHT CHEEK

## 2022-08-09 ASSESSMENT — LOCATION DETAILED DESCRIPTION DERM
LOCATION DETAILED: RIGHT SUPERIOR ANTERIOR NECK
LOCATION DETAILED: RIGHT INFERIOR CENTRAL MALAR CHEEK
LOCATION DETAILED: LEFT SUPERIOR CENTRAL BUCCAL CHEEK
LOCATION DETAILED: LEFT SUPERIOR LATERAL NECK

## 2022-08-09 ASSESSMENT — LOCATION ZONE DERM
LOCATION ZONE: NECK
LOCATION ZONE: FACE

## 2022-08-09 NOTE — PROCEDURE: HYDRAFACIAL
Vacuum Pressure High Setting (Will Not Render If Set To 0): 0
Solution Override
Solution: Activ-4
Number Of Passes: 1
Vacuum Pressure Low Setting (Will Not Render If Set To 0): 13
Tip Override
Procedure: Peel
Tip: Hydropeel Tip, Teal
Tip Override: Lip Perk Hydropeel Tip
Solution: GlySal 15%
Vacuum Pressure Low Setting (Will Not Render If Set To 0): 12
Procedure: Extraction
Consent: Written consent obtained, risks reviewed including but not limited to crusting, scabbing, blistering, scarring, darker or lighter pigmentary change, bruising, and/or incomplete response.
Vacuum Pressure Low Setting (Will Not Render If Set To 0): 22
Post-Care Instructions: I reviewed with the patient in detail post-care instructions. Patient should stay away from the sun and wear sun protection until treated areas are fully healed.
Tip: Hydropeel Tip, Blue
Indication: anti-aging
Procedure: Exfoliation
Number Of Passes: 2
Tip: Hydropeel Tip, Clear
Solution: Beta-HD
Procedure: Fusion
Location: face
Vacuum Pressure Low Setting (Will Not Render If Set To 0): 22
Vacuum Pressure Low Setting (Will Not Render If Set To 0): 13
Solution Override: Lip Perk Solution

## 2022-08-09 NOTE — PROCEDURE: HYDRAFACIAL
Vacuum Pressure High Setting (Will Not Render If Set To 0): 0
Treatment Number: 1
Vacuum Pressure Low Setting (Will Not Render If Set To 0): 13
Solution Override
Tip: Hydropeel Tip, Blue
Procedure: Extraction
Comments: Face and neck
Number Of Passes: 2
Solution Override: Lip Perk Solution
Tip: Hydropeel Tip, Clear
Solution: GlySal 15%
Indication: anti-aging
Vacuum Pressure Low Setting (Will Not Render If Set To 0): 22
Tip Override
Procedure: Exfoliation
Solution: Beta-HD
Consent: Written consent obtained, risks reviewed including but not limited to crusting, scabbing, blistering, scarring, darker or lighter pigmentary change, bruising, and/or incomplete response.
Procedure: Peel
Procedure: Fusion
Location: face
Post-Care Instructions: I reviewed with the patient in detail post-care instructions. Patient should stay away from the sun and wear sun protection until treated areas are fully healed.
Solution: Activ-4
Vacuum Pressure Low Setting (Will Not Render If Set To 0): 13
Tip Override: Lip Perk Hydropeel Tip
Vacuum Pressure Low Setting (Will Not Render If Set To 0): 22
Tip: Hydropeel Tip, Teal
Vacuum Pressure Low Setting (Will Not Render If Set To 0): 12
Location Override: neck

## 2022-08-09 NOTE — PROCEDURE: BOTOX
Levator Labii Superioris Units: 0
Show Ucl Units: No
Show Glabellar Units: Yes
Masseter Units: 18
Post-Care Instructions: Patient instructed to not lie down for 4 hours and limit physical activity for 24 hours.
Expiration Date (Month Year): 06/23
Lot #: [id number]
Forehead Units: 10
Additional Area 1 Location: [facility]
Glabellar Complex Units: 15
Dilution (U/0.1 Cc): 4
Detail Level: Detailed
Depressor Anguli Oris Units: 5
Consent: Written consent obtained. Risks include but not limited to lid/brow ptosis, bruising, swelling, diplopia, temporary effect, incomplete chemical denervation.

## 2022-08-09 NOTE — PROCEDURE: DERMAPLANE
Blade: 10 blade scalpel
Post-Care Instructions: I reviewed with the patient in detail post-care instructions.
Treatment Areas: face
Post-Procedure Instructions: Following the dermaplane procedure, Moisturizer and SPF was applied.
Pre-Procedure Text: The patient was placed in a recumbant position on the procedure table.
Detail Level: Zone
Treatment Area Prep: acetone

## 2022-08-29 ENCOUNTER — RX ONLY (OUTPATIENT)
Age: 47
Setting detail: RX ONLY
End: 2022-08-29

## 2022-08-29 ENCOUNTER — APPOINTMENT (RX ONLY)
Dept: URBAN - METROPOLITAN AREA CLINIC 154 | Facility: CLINIC | Age: 47
Setting detail: DERMATOLOGY
End: 2022-08-29

## 2022-08-29 VITALS
SYSTOLIC BLOOD PRESSURE: 115 MMHG | DIASTOLIC BLOOD PRESSURE: 80 MMHG | HEART RATE: 81 BPM | HEIGHT: 64 IN | OXYGEN SATURATION: 99 % | WEIGHT: 143 LBS

## 2022-08-29 DIAGNOSIS — Z41.9 ENCOUNTER FOR PROCEDURE FOR PURPOSES OTHER THAN REMEDYING HEALTH STATE, UNSPECIFIED: ICD-10-CM

## 2022-08-29 PROCEDURE — ? OTHER

## 2022-08-29 RX ORDER — LEVOTHYROXINE, LIOTHYRONINE 57; 13.5 UG/1; UG/1
TABLET ORAL
Qty: 90 | Refills: 1 | Status: ERX

## 2022-08-29 RX ORDER — TESTOSTERONE
POWDER (GRAM) MISCELLANEOUS
Qty: 90 | Refills: 0 | Status: ERX | COMMUNITY
Start: 2022-08-29

## 2022-08-30 ENCOUNTER — RX ONLY (OUTPATIENT)
Age: 47
Setting detail: RX ONLY
End: 2022-08-30

## 2022-08-30 RX ORDER — PROGESTERONE 100 %
POWDER (GRAM) MISCELLANEOUS
Qty: 90 | Refills: 0 | Status: ERX

## 2022-09-06 ENCOUNTER — APPOINTMENT (RX ONLY)
Dept: URBAN - METROPOLITAN AREA CLINIC 154 | Facility: CLINIC | Age: 47
Setting detail: DERMATOLOGY
End: 2022-09-06

## 2022-09-06 DIAGNOSIS — Z41.9 ENCOUNTER FOR PROCEDURE FOR PURPOSES OTHER THAN REMEDYING HEALTH STATE, UNSPECIFIED: ICD-10-CM

## 2022-09-06 PROCEDURE — ? HYDRAFACIAL

## 2022-09-06 PROCEDURE — ? DERMAPLANE

## 2022-09-06 ASSESSMENT — LOCATION ZONE DERM
LOCATION ZONE: NECK
LOCATION ZONE: FACE

## 2022-09-06 ASSESSMENT — LOCATION SIMPLE DESCRIPTION DERM
LOCATION SIMPLE: LEFT ANTERIOR NECK
LOCATION SIMPLE: LEFT CHEEK

## 2022-09-06 ASSESSMENT — LOCATION DETAILED DESCRIPTION DERM
LOCATION DETAILED: LEFT INFERIOR MEDIAL MALAR CHEEK
LOCATION DETAILED: LEFT SUPERIOR LATERAL NECK

## 2022-09-06 NOTE — PROCEDURE: DERMAPLANE
Pre-Procedure Text: The patient was placed in a recumbant position on the procedure table.
Blade: 10 blade scalpel
Post-Care Instructions: I reviewed with the patient in detail post-care instructions.
Treatment Areas: face and neck
Post-Procedure Instructions: Following the dermaplane procedure, Moisturizer and SPF was applied.
Treatment Area Prep: acetone
Detail Level: Zone

## 2022-09-06 NOTE — PROCEDURE: HYDRAFACIAL
Vacuum Pressure High Setting (Will Not Render If Set To 0): 0
Vacuum Pressure Low Setting (Will Not Render If Set To 0): 22
Solution Override
Tip: Hydropeel Tip, Blue
Procedure: Fusion
Solution: Beta-HD
Vacuum Pressure Low Setting (Will Not Render If Set To 0): 13
Consent: Written consent obtained, risks reviewed including but not limited to crusting, scabbing, blistering, scarring, darker or lighter pigmentary change, bruising, and/or incomplete response.
Location: face
Number Of Passes: 2
Post-Care Instructions: I reviewed with the patient in detail post-care instructions. Patient should stay away from the sun and wear sun protection until treated areas are fully healed.
Tip: Hydropeel Tip, Teal
Number Of Passes: 1
Solution Override: Lip Perk Solution
Procedure: Peel
Solution: Activ-4
Vacuum Pressure Low Setting (Will Not Render If Set To 0): 12
Vacuum Pressure Low Setting (Will Not Render If Set To 0): 13
Tip Override
Procedure: Extraction
Indication: anti-aging
Tip Override: Lip Perk Hydropeel Tip
Solution: GlySal 15%
Tip: Hydropeel Tip, Clear
Procedure: Exfoliation
Vacuum Pressure Low Setting (Will Not Render If Set To 0): 22

## 2022-10-04 ENCOUNTER — APPOINTMENT (RX ONLY)
Dept: URBAN - METROPOLITAN AREA CLINIC 154 | Facility: CLINIC | Age: 47
Setting detail: DERMATOLOGY
End: 2022-10-04

## 2022-10-04 DIAGNOSIS — Z41.9 ENCOUNTER FOR PROCEDURE FOR PURPOSES OTHER THAN REMEDYING HEALTH STATE, UNSPECIFIED: ICD-10-CM

## 2022-10-04 PROCEDURE — ? DERMAPLANE

## 2022-10-04 PROCEDURE — ? HYDRAFACIAL

## 2022-10-04 ASSESSMENT — LOCATION ZONE DERM
LOCATION ZONE: FACE
LOCATION ZONE: NECK

## 2022-10-04 ASSESSMENT — LOCATION DETAILED DESCRIPTION DERM
LOCATION DETAILED: LEFT SUPERIOR LATERAL NECK
LOCATION DETAILED: LEFT SUPERIOR CENTRAL BUCCAL CHEEK

## 2022-10-04 ASSESSMENT — LOCATION SIMPLE DESCRIPTION DERM
LOCATION SIMPLE: LEFT ANTERIOR NECK
LOCATION SIMPLE: LEFT CHEEK

## 2022-10-04 NOTE — PROCEDURE: HYDRAFACIAL
Vacuum Pressure High Setting (Will Not Render If Set To 0): 0
Solution Override
Procedure: Peel
Number Of Passes: 1
Vacuum Pressure Low Setting (Will Not Render If Set To 0): 13
Tip: Hydropeel Tip, Clear
Tip Override
Tip: Hydropeel Tip, Teal
Vacuum Pressure Low Setting (Will Not Render If Set To 0): 13
Solution Override: Lip Perk Solution
Solution: GlySal 7.5%
Procedure: Fusion
Procedure: Extraction
Vacuum Pressure Low Setting (Will Not Render If Set To 0): 22
Tip: Hydropeel Tip, Blue
Indication: anti-aging
Consent: Written consent obtained, risks reviewed including but not limited to crusting, scabbing, blistering, scarring, darker or lighter pigmentary change, bruising, and/or incomplete response.
Procedure: Exfoliation
Solution: Beta-HD
Post-Care Instructions: I reviewed with the patient in detail post-care instructions. Patient should stay away from the sun and wear sun protection until treated areas are fully healed.
Number Of Passes: 2
Vacuum Pressure Low Setting (Will Not Render If Set To 0): 12
Location: face
Tip Override: Lip Perk Hydropeel Tip
Solution: Activ-4
Vacuum Pressure Low Setting (Will Not Render If Set To 0): 22

## 2022-10-04 NOTE — PROCEDURE: DERMAPLANE
Blade: 10 blade scalpel
Treatment Area Prep: acetone
Treatment Areas: face and neck
Detail Level: Zone
Post-Procedure Instructions: Following the dermaplane procedure, Moisturizer and SPF was applied.
Post-Care Instructions: I reviewed with the patient in detail post-care instructions.
Pre-Procedure Text: The patient was placed in a recumbant position on the procedure table.

## 2022-11-01 ENCOUNTER — APPOINTMENT (RX ONLY)
Dept: URBAN - METROPOLITAN AREA CLINIC 154 | Facility: CLINIC | Age: 47
Setting detail: DERMATOLOGY
End: 2022-11-01

## 2022-11-01 DIAGNOSIS — Z41.9 ENCOUNTER FOR PROCEDURE FOR PURPOSES OTHER THAN REMEDYING HEALTH STATE, UNSPECIFIED: ICD-10-CM

## 2022-11-01 PROCEDURE — ? DERMAPLANE

## 2022-11-01 PROCEDURE — ? HYDRAFACIAL

## 2022-11-01 PROCEDURE — ? JEUVEAU

## 2022-11-01 ASSESSMENT — LOCATION ZONE DERM
LOCATION ZONE: FACE
LOCATION ZONE: NECK

## 2022-11-01 NOTE — PROCEDURE: JEUVEAU
Anterior Platysmal Bands Units: 0
Show Lateral Platysmal Band Units: Yes
Show Right And Left Pupillary Line Units: No
Expiration Date (Month Year): 4/23
Depressor Anguli Oris Units: 5
Detail Level: Detailed
Inferior Lateral Orbicularis Oculi Units: 12
Dilution (U/0.1 Cc): 4
Post-Care Instructions: Patient instructed to not lie down for 4 hours and limit physical activity for 24 hours.
Glabellar Complex Units: 18
Additional Area 1 Location: [facility]
Lot #: [id number]
Consent: Written consent obtained. Risks include but not limited to lid/brow ptosis, bruising, swelling, diplopia, temporary effect, incomplete chemical denervation.

## 2022-11-01 NOTE — PROCEDURE: DERMAPLANE
Post-Procedure Instructions: Following the dermaplane procedure, Moisturizer and SPF was applied.
Treatment Areas: face and neck
Blade: 10 blade scalpel
Detail Level: Zone
Pre-Procedure Text: The patient was placed in a recumbant position on the procedure table.
Post-Care Instructions: I reviewed with the patient in detail post-care instructions.
Treatment Area Prep: acetone

## 2022-11-01 NOTE — PROCEDURE: HYDRAFACIAL
Vacuum Pressure High Setting (Will Not Render If Set To 0): 0
Tip: Hydropeel Tip, Clear
Number Of Passes: 1
Solution Override: Lip Perk Solution
Tip: Hydropeel Tip, Blue
Procedure: Extraction
Tip Override
Tip: Hydropeel Tip, Teal
Number Of Passes: 2
Procedure: Fusion
Vacuum Pressure Low Setting (Will Not Render If Set To 0): 22
Solution: GlySal 30%
Indication: acne
Consent: Written consent obtained, risks reviewed including but not limited to crusting, scabbing, blistering, scarring, darker or lighter pigmentary change, bruising, and/or incomplete response.
Solution Override
Post-Care Instructions: I reviewed with the patient in detail post-care instructions. Patient should stay away from the sun and wear sun protection until treated areas are fully healed.
Tip Override: Lip Perk Hydropeel Tip
Procedure: Exfoliation
Location: face
Vacuum Pressure Low Setting (Will Not Render If Set To 0): 12
Procedure: Peel
Vacuum Pressure Low Setting (Will Not Render If Set To 0): 13
Solution: Activ-4
Vacuum Pressure Low Setting (Will Not Render If Set To 0): 13
Vacuum Pressure Low Setting (Will Not Render If Set To 0): 22
Solution: GlySal 7.5%

## 2022-11-29 ENCOUNTER — APPOINTMENT (RX ONLY)
Dept: URBAN - METROPOLITAN AREA CLINIC 154 | Facility: CLINIC | Age: 47
Setting detail: DERMATOLOGY
End: 2022-11-29

## 2022-11-29 ENCOUNTER — RX ONLY (OUTPATIENT)
Age: 47
Setting detail: RX ONLY
End: 2022-11-29

## 2022-11-29 VITALS
OXYGEN SATURATION: 99 % | SYSTOLIC BLOOD PRESSURE: 120 MMHG | HEIGHT: 64 IN | WEIGHT: 135 LBS | HEART RATE: 89 BPM | DIASTOLIC BLOOD PRESSURE: 80 MMHG

## 2022-11-29 DIAGNOSIS — Z41.9 ENCOUNTER FOR PROCEDURE FOR PURPOSES OTHER THAN REMEDYING HEALTH STATE, UNSPECIFIED: ICD-10-CM

## 2022-11-29 PROCEDURE — ? OTHER

## 2022-11-29 RX ORDER — NALTREXONE HCL 100 %
POWDER (GRAM) MISCELLANEOUS
Qty: 90 | Refills: 0 | Status: ERX | COMMUNITY
Start: 2022-11-29

## 2022-11-29 RX ORDER — NALTREXONE HCL 100 %
POWDER (GRAM) MISCELLANEOUS
Qty: 42 | Refills: 0 | Status: ERX

## 2022-11-29 RX ORDER — SPIRONOLACTONE 50 MG/1
TABLET, FILM COATED ORAL
Qty: 180 | Refills: 0 | Status: ERX | COMMUNITY
Start: 2022-11-29

## 2022-11-30 ENCOUNTER — RX ONLY (OUTPATIENT)
Age: 47
Setting detail: RX ONLY
End: 2022-11-30

## 2022-11-30 ENCOUNTER — APPOINTMENT (RX ONLY)
Dept: URBAN - METROPOLITAN AREA CLINIC 154 | Facility: CLINIC | Age: 47
Setting detail: DERMATOLOGY
End: 2022-11-30

## 2022-11-30 DIAGNOSIS — Z41.9 ENCOUNTER FOR PROCEDURE FOR PURPOSES OTHER THAN REMEDYING HEALTH STATE, UNSPECIFIED: ICD-10-CM

## 2022-11-30 PROCEDURE — ? DERMAPLANE

## 2022-11-30 PROCEDURE — ? HYDRAFACIAL

## 2022-11-30 RX ORDER — PROGESTERONE 100 %
POWDER (GRAM) MISCELLANEOUS
Qty: 90 | Refills: 0 | Status: ERX

## 2022-11-30 RX ORDER — TESTOSTERONE
POWDER (GRAM) MISCELLANEOUS
Qty: 90 | Refills: 0 | Status: ERX

## 2022-11-30 ASSESSMENT — LOCATION ZONE DERM
LOCATION ZONE: FACE
LOCATION ZONE: NECK

## 2022-11-30 ASSESSMENT — LOCATION DETAILED DESCRIPTION DERM
LOCATION DETAILED: RIGHT SUPERIOR LATERAL NECK
LOCATION DETAILED: RIGHT INFERIOR CENTRAL MALAR CHEEK

## 2022-11-30 ASSESSMENT — LOCATION SIMPLE DESCRIPTION DERM
LOCATION SIMPLE: RIGHT ANTERIOR NECK
LOCATION SIMPLE: RIGHT CHEEK

## 2022-11-30 NOTE — PROCEDURE: DERMAPLANE
Pre-Procedure Text: The patient was placed in a recumbant position on the procedure table.
Post-Procedure Instructions: Following the dermaplane procedure, Moisturizer and SPF was applied.
Treatment Areas: face and neck
Detail Level: Zone
Treatment Area Prep: acetone
Blade: 10 blade scalpel
Post-Care Instructions: I reviewed with the patient in detail post-care instructions.

## 2022-11-30 NOTE — PROCEDURE: HYDRAFACIAL
Solution Override
Tip Override
Number Of Passes: 2
Solution: Activ-4
Vacuum Pressure Low Setting (Will Not Render If Set To 0): 12
Vacuum Pressure High Setting (Will Not Render If Set To 0): 0
Tip: Hydropeel Tip, Teal
Solution: GlySal 7.5%
Consent: Written consent obtained, risks reviewed including but not limited to crusting, scabbing, blistering, scarring, darker or lighter pigmentary change, bruising, and/or incomplete response.
Procedure: Peel
Tip Override: Lip Perk Hydropeel Tip
Vacuum Pressure Low Setting (Will Not Render If Set To 0): 13
Treatment Number: 1
Post-Care Instructions: I reviewed with the patient in detail post-care instructions. Patient should stay away from the sun and wear sun protection until treated areas are fully healed.
Tip: Hydropeel Tip, Clear
Vacuum Pressure Low Setting (Will Not Render If Set To 0): 22
Indication: anti-aging
Procedure: Fusion
Vacuum Pressure Low Setting (Will Not Render If Set To 0): 13
Tip: Hydropeel Tip, Blue
Solution: GlySal 15%
Solution Override: Lip Perk Solution
Procedure: Exfoliation
Location: face
Vacuum Pressure Low Setting (Will Not Render If Set To 0): 22
Procedure: Extraction

## 2022-12-27 ENCOUNTER — APPOINTMENT (RX ONLY)
Dept: URBAN - METROPOLITAN AREA CLINIC 154 | Facility: CLINIC | Age: 47
Setting detail: DERMATOLOGY
End: 2022-12-27

## 2022-12-27 DIAGNOSIS — Z41.9 ENCOUNTER FOR PROCEDURE FOR PURPOSES OTHER THAN REMEDYING HEALTH STATE, UNSPECIFIED: ICD-10-CM

## 2022-12-27 PROCEDURE — ? DERMAPLANE

## 2022-12-27 PROCEDURE — ? HYDRAFACIAL

## 2022-12-27 ASSESSMENT — LOCATION ZONE DERM
LOCATION ZONE: FACE
LOCATION ZONE: NECK

## 2022-12-27 ASSESSMENT — LOCATION SIMPLE DESCRIPTION DERM
LOCATION SIMPLE: LEFT ANTERIOR NECK
LOCATION SIMPLE: LEFT CHEEK

## 2022-12-27 ASSESSMENT — LOCATION DETAILED DESCRIPTION DERM
LOCATION DETAILED: LEFT INFERIOR CENTRAL MALAR CHEEK
LOCATION DETAILED: LEFT SUPERIOR LATERAL NECK

## 2022-12-27 NOTE — PROCEDURE: DERMAPLANE
Post-Care Instructions: I reviewed with the patient in detail post-care instructions.
Detail Level: Zone
Blade: 10 blade scalpel
Treatment Areas: face and neck
Pre-Procedure Text: The patient was placed in a recumbant position on the procedure table.
Treatment Area Prep: acetone
Post-Procedure Instructions: Following the dermaplane procedure, Moisturizer and SPF was applied.

## 2022-12-27 NOTE — PROCEDURE: HYDRAFACIAL
Procedure: Exfoliation
Solution Override
Tip: Hydropeel Tip, Teal
Procedure: Extraction
Vacuum Pressure Low Setting (Will Not Render If Set To 0): 13
Number Of Passes: 1
Tip: Hydropeel Tip, Clear
Location: face
Procedure: Peel
Vacuum Pressure High Setting (Will Not Render If Set To 0): 0
Solution Override: Lip Perk Solution
Solution: Activ-4
Solution: GlySal 7.5%
Consent: Written consent obtained, risks reviewed including but not limited to crusting, scabbing, blistering, scarring, darker or lighter pigmentary change, bruising, and/or incomplete response.
Vacuum Pressure Low Setting (Will Not Render If Set To 0): 13
Procedure: Fusion
Tip Override
Solution: GlySal 15%
Post-Care Instructions: I reviewed with the patient in detail post-care instructions. Patient should stay away from the sun and wear sun protection until treated areas are fully healed.
Vacuum Pressure Low Setting (Will Not Render If Set To 0): 22
Vacuum Pressure Low Setting (Will Not Render If Set To 0): 22
Tip: Hydropeel Tip, Blue
Tip Override: Lip Perk Hydropeel Tip
Vacuum Pressure Low Setting (Will Not Render If Set To 0): 12
Indication: anti-aging
Number Of Passes: 2

## 2023-01-05 ENCOUNTER — APPOINTMENT (RX ONLY)
Dept: URBAN - METROPOLITAN AREA CLINIC 154 | Facility: CLINIC | Age: 48
Setting detail: DERMATOLOGY
End: 2023-01-05

## 2023-01-05 DIAGNOSIS — Z41.9 ENCOUNTER FOR PROCEDURE FOR PURPOSES OTHER THAN REMEDYING HEALTH STATE, UNSPECIFIED: ICD-10-CM

## 2023-01-05 PROCEDURE — ? SCITON BBL HERO

## 2023-01-05 PROCEDURE — ? SCITON MOXI

## 2023-01-05 ASSESSMENT — LOCATION DETAILED DESCRIPTION DERM
LOCATION DETAILED: RIGHT MEDIAL SUPERIOR CHEST
LOCATION DETAILED: RIGHT SUPERIOR ANTERIOR NECK
LOCATION DETAILED: RIGHT INFERIOR CENTRAL MALAR CHEEK

## 2023-01-05 ASSESSMENT — LOCATION SIMPLE DESCRIPTION DERM
LOCATION SIMPLE: RIGHT ANTERIOR NECK
LOCATION SIMPLE: CHEST
LOCATION SIMPLE: RIGHT CHEEK

## 2023-01-05 ASSESSMENT — LOCATION ZONE DERM
LOCATION ZONE: NECK
LOCATION ZONE: FACE
LOCATION ZONE: TRUNK

## 2023-01-05 NOTE — PROCEDURE: SCITON BBL HERO
Add Setting 4?: no
Filter: 560nm Filter
Filter: 515nm Filter
Pulse Width - Will Not Render If 0: 15
Skin Type: III
Pulse Width Units: milliseconds
Consent: Written consent obtained.  Risks reviewed including but not limited to crusting, scabbing, blistering, scarring, darker or lighter pigmentary change, and incomplete clearance.
Detail Level: Zone
Anesthesia Type: 1% lidocaine with epinephrine
Temp (C): 25
Procedure Note: After applying gel and applying protective eyeware, treatment was administered using the setting parameters listed above.
Spot Size: Finesse Adapter Size: 15 x 15 mm square
Temp (C): 20
Post-Care Instructions: I reviewed with the patient in detail post-care instructions. Patient should avoid sun exposure before and after treatment.  Patient should wear sunscreen.
Spot Size: Finesse Adapter Size: 15 x 45 mm (No Finesse Adapter)
Fluence (J/Cm2) - Will Not Render If 0: 0
Rate (Hz): 4
Fluence (J/Cm2) - Will Not Render If 0: 5
Topical Anesthesia Type: 23% lidocaine, 7% tetracaine
Spot Size: Finesse Adapter Size: 11 mm round
Treatment Number: 1
Pulse Width - Will Not Render If 0: 3
Fluence (J/Cm2) - Will Not Render If 0: 12
Add Setting 2?: yes

## 2023-01-05 NOTE — PROCEDURE: SCITON MOXI
Length Of Topical Anesthesia Application (Optional): 30 minutes
Detail Level: Detailed
Topical Anesthesia?: 23% lidocaine, 7% tetracaine
Total Coverage (%): 20%
Number Of Passes: 8
Treatment Number: 1
Energy (J/Cm2): 20
Anesthesia Type: 1% lidocaine with epinephrine
Consent: Written consent obtained.  Risks were reviewed including but not limited to crusting, scabbing, blistering, scarring, darker or lighter pigmentary change, incomplete improvement, prolonged erythema and facial swelling, infection, and bleeding.
Laser Type: Fractionated 1927nm, thulium laser
Level 3
External Cooling Fan Speed: 5
Pre=procedure Text: After consent was obtained, the treatment areas were cleaned and treated using the parameters noted above.
Post-Care Instructions: I reviewed with the patient in detail post-care instructions.  Recommended diligent sun protection and sun avoidance.

## 2023-02-28 ENCOUNTER — RX ONLY (OUTPATIENT)
Age: 48
Setting detail: RX ONLY
End: 2023-02-28

## 2023-02-28 ENCOUNTER — APPOINTMENT (RX ONLY)
Dept: URBAN - METROPOLITAN AREA CLINIC 154 | Facility: CLINIC | Age: 48
Setting detail: DERMATOLOGY
End: 2023-02-28

## 2023-02-28 DIAGNOSIS — Z41.9 ENCOUNTER FOR PROCEDURE FOR PURPOSES OTHER THAN REMEDYING HEALTH STATE, UNSPECIFIED: ICD-10-CM

## 2023-02-28 PROCEDURE — ? OTHER

## 2023-02-28 RX ORDER — TESTOSTERONE
POWDER (GRAM) MISCELLANEOUS
Qty: 30 | Refills: 0 | Status: ERX

## 2023-02-28 RX ORDER — PROGESTERONE 100 %
POWDER (GRAM) MISCELLANEOUS
Qty: 90 | Refills: 1 | Status: ERX

## 2023-02-28 RX ORDER — LEVOTHYROXINE, LIOTHYRONINE 57; 13.5 UG/1; UG/1
TABLET ORAL
Qty: 90 | Refills: 1 | Status: ERX

## 2023-02-28 RX ORDER — TESTOSTERONE
POWDER (GRAM) MISCELLANEOUS
Qty: 90 | Refills: 0 | Status: ERX

## 2023-02-28 RX ORDER — MINOXIDIL 2.5 MG/1
TABLET ORAL
Qty: 90 | Refills: 0 | Status: ERX | COMMUNITY
Start: 2023-02-28

## 2023-02-28 RX ORDER — SPIRONOLACTONE 50 MG/1
TABLET, FILM COATED ORAL
Qty: 180 | Refills: 1 | Status: ERX

## 2023-03-08 ENCOUNTER — APPOINTMENT (RX ONLY)
Dept: URBAN - METROPOLITAN AREA CLINIC 154 | Facility: CLINIC | Age: 48
Setting detail: DERMATOLOGY
End: 2023-03-08

## 2023-03-08 DIAGNOSIS — Z41.9 ENCOUNTER FOR PROCEDURE FOR PURPOSES OTHER THAN REMEDYING HEALTH STATE, UNSPECIFIED: ICD-10-CM

## 2023-03-08 PROCEDURE — ? BOTOX

## 2023-03-08 PROCEDURE — ? HYDRAFACIAL

## 2023-03-08 PROCEDURE — ? DERMAPLANE

## 2023-03-08 ASSESSMENT — LOCATION SIMPLE DESCRIPTION DERM
LOCATION SIMPLE: RIGHT ANTERIOR NECK
LOCATION SIMPLE: RIGHT CHEEK

## 2023-03-08 ASSESSMENT — LOCATION ZONE DERM
LOCATION ZONE: FACE
LOCATION ZONE: NECK

## 2023-03-08 ASSESSMENT — LOCATION DETAILED DESCRIPTION DERM
LOCATION DETAILED: RIGHT SUPERIOR LATERAL NECK
LOCATION DETAILED: RIGHT SUPERIOR MEDIAL BUCCAL CHEEK

## 2023-03-08 NOTE — PROCEDURE: BOTOX
[facility] Units: 0
Show Lateral Platysmal Band Units: Yes
Detail Level: Detailed
Expiration Date (Month Year): 06/23
Forehead Units: 10
Glabellar Complex Units: 15
Show Right And Left Periorbital Units: No
Lot #: [id number]
Additional Area 1 Location: [facility]
Incrementing Botox Units: By 0.5 Units
Depressor Anguli Oris Units: 5
Dilution (U/0.1 Cc): 4
Consent: Written consent obtained. Risks include but not limited to lid/brow ptosis, bruising, swelling, diplopia, temporary effect, incomplete chemical denervation.
Masseter Units: 30
Post-Care Instructions: Patient instructed to not lie down for 4 hours and limit physical activity for 24 hours.

## 2023-03-08 NOTE — PROCEDURE: HYDRAFACIAL
Vacuum Pressure Low Setting (Will Not Render If Set To 0): 22
Vacuum Pressure Low Setting (Will Not Render If Set To 0): 0
Vacuum Pressure Low Setting (Will Not Render If Set To 0): 12
Solution: GlySal 7.5%
Solution Override
Tip: Hydropeel Tip, Blue
Number Of Passes: 1
Number Of Passes: 2
Indication: anti-aging
Tip: Hydropeel Tip, Teal
Tip Override
Tip: Hydropeel Tip, Clear
Procedure: Exfoliation
Tip Override: Lip Perk Hydropeel Tip
Solution Override: Lip Perk Solution
Location: face
Consent: Written consent obtained, risks reviewed including but not limited to crusting, scabbing, blistering, scarring, darker or lighter pigmentary change, bruising, and/or incomplete response.
Procedure: Peel
Procedure: Fusion
Post-Care Instructions: I reviewed with the patient in detail post-care instructions. Patient should stay away from the sun and wear sun protection until treated areas are fully healed.
Solution: Activ-4
Vacuum Pressure Low Setting (Will Not Render If Set To 0): 13
Vacuum Pressure Low Setting (Will Not Render If Set To 0): 13
Vacuum Pressure Low Setting (Will Not Render If Set To 0): 22
Solution: GlySal 15%
Procedure: Extraction

## 2023-03-08 NOTE — PROCEDURE: DERMAPLANE
Treatment Areas: face and neck
Post-Procedure Instructions: Following the dermaplane procedure, Moisturizer and SPF was applied.
Pre-Procedure Text: The patient was placed in a recumbant position on the procedure table.
Post-Care Instructions: I reviewed with the patient in detail post-care instructions.
Detail Level: Zone
Blade: 10 blade scalpel
Treatment Area Prep: acetone

## 2023-04-06 ENCOUNTER — APPOINTMENT (RX ONLY)
Dept: URBAN - METROPOLITAN AREA CLINIC 154 | Facility: CLINIC | Age: 48
Setting detail: DERMATOLOGY
End: 2023-04-06

## 2023-04-06 DIAGNOSIS — Z41.9 ENCOUNTER FOR PROCEDURE FOR PURPOSES OTHER THAN REMEDYING HEALTH STATE, UNSPECIFIED: ICD-10-CM

## 2023-04-06 PROCEDURE — ? HYDRAFACIAL

## 2023-04-06 PROCEDURE — ? DERMAPLANE

## 2023-04-06 ASSESSMENT — LOCATION DETAILED DESCRIPTION DERM
LOCATION DETAILED: RIGHT INFERIOR CENTRAL MALAR CHEEK
LOCATION DETAILED: RIGHT SUPERIOR ANTERIOR NECK

## 2023-04-06 ASSESSMENT — LOCATION SIMPLE DESCRIPTION DERM
LOCATION SIMPLE: RIGHT ANTERIOR NECK
LOCATION SIMPLE: RIGHT CHEEK

## 2023-04-06 ASSESSMENT — LOCATION ZONE DERM
LOCATION ZONE: FACE
LOCATION ZONE: NECK

## 2023-04-06 NOTE — PROCEDURE: HYDRAFACIAL
Number Of Passes: 0
Tip Override
Procedure: Extraction
Location: face
Procedure: Fusion
Solution: Activ-4
Vacuum Pressure Low Setting (Will Not Render If Set To 0): 13
Solution: GlySal 15%
Tip Override: Lip Perk Hydropeel Tip
Vacuum Pressure Low Setting (Will Not Render If Set To 0): 22
Vacuum Pressure Low Setting (Will Not Render If Set To 0): 22
Solution: GlySal 7.5%
Vacuum Pressure Low Setting (Will Not Render If Set To 0): 12
Solution Override
Consent: Written consent obtained, risks reviewed including but not limited to crusting, scabbing, blistering, scarring, darker or lighter pigmentary change, bruising, and/or incomplete response.
Vacuum Pressure Low Setting (Will Not Render If Set To 0): 13
Tip: Hydropeel Tip, Blue
Post-Care Instructions: I reviewed with the patient in detail post-care instructions. Patient should stay away from the sun and wear sun protection until treated areas are fully healed.
Number Of Passes: 2
Tip: Hydropeel Tip, Teal
Indication: anti-aging
Number Of Passes: 1
Solution Override: Lip Perk Solution
Tip: Hydropeel Tip, Clear
Procedure: Exfoliation
Procedure: Peel

## 2023-04-06 NOTE — PROCEDURE: DERMAPLANE
Post-Procedure Instructions: Following the dermaplane procedure, Moisturizer and SPF was applied.
Treatment Area Prep: acetone
Post-Care Instructions: I reviewed with the patient in detail post-care instructions.
Pre-Procedure Text: The patient was placed in a recumbant position on the procedure table.
Detail Level: Zone
Blade: 10 blade scalpel
Treatment Areas: face and neck

## 2023-05-02 ENCOUNTER — APPOINTMENT (RX ONLY)
Dept: URBAN - METROPOLITAN AREA CLINIC 154 | Facility: CLINIC | Age: 48
Setting detail: DERMATOLOGY
End: 2023-05-02

## 2023-05-02 DIAGNOSIS — Z41.9 ENCOUNTER FOR PROCEDURE FOR PURPOSES OTHER THAN REMEDYING HEALTH STATE, UNSPECIFIED: ICD-10-CM

## 2023-05-02 PROCEDURE — ? HYDRAFACIAL

## 2023-05-02 PROCEDURE — ? DERMAPLANE

## 2023-05-02 ASSESSMENT — LOCATION DETAILED DESCRIPTION DERM
LOCATION DETAILED: RIGHT INFERIOR CENTRAL MALAR CHEEK
LOCATION DETAILED: RIGHT SUPERIOR LATERAL NECK

## 2023-05-02 ASSESSMENT — LOCATION ZONE DERM
LOCATION ZONE: FACE
LOCATION ZONE: NECK

## 2023-05-02 ASSESSMENT — LOCATION SIMPLE DESCRIPTION DERM
LOCATION SIMPLE: RIGHT CHEEK
LOCATION SIMPLE: RIGHT ANTERIOR NECK

## 2023-05-02 NOTE — PROCEDURE: HYDRAFACIAL
Tip: Hydropeel Tip, Teal
Location: face
Number Of Passes: 0
Solution: Activ-4
Vacuum Pressure Low Setting (Will Not Render If Set To 0): 13
Procedure: Fusion
Number Of Passes: 1
Solution: GlySal 15%
Vacuum Pressure Low Setting (Will Not Render If Set To 0): 22
Tip Override
Procedure: Extraction
Indication: skin texture
Tip: Hydropeel Tip, Clear
Solution Override: Lip Perk Solution
Solution Override
Tip: Hydropeel Tip, Blue
Solution: GlySal 7.5%
Vacuum Pressure Low Setting (Will Not Render If Set To 0): 22
Number Of Passes: 2
Post-Care Instructions: I reviewed with the patient in detail post-care instructions. Patient should stay away from the sun and wear sun protection until treated areas are fully healed.
Vacuum Pressure Low Setting (Will Not Render If Set To 0): 13
Consent: Written consent obtained, risks reviewed including but not limited to crusting, scabbing, blistering, scarring, darker or lighter pigmentary change, bruising, and/or incomplete response.
Vacuum Pressure Low Setting (Will Not Render If Set To 0): 12
Procedure: Exfoliation
Procedure: Peel
Tip Override: Lip Perk Hydropeel Tip

## 2023-05-02 NOTE — PROCEDURE: DERMAPLANE
Treatment Areas: face and neck
Pre-Procedure Text: The patient was placed in a recumbant position on the procedure table.
Detail Level: Zone
Treatment Area Prep: acetone
Post-Care Instructions: I reviewed with the patient in detail post-care instructions.
Post-Procedure Instructions: Following the dermaplane procedure, Moisturizer and SPF was applied.
Blade: 10 blade scalpel

## 2023-06-02 ENCOUNTER — RX ONLY (OUTPATIENT)
Age: 48
Setting detail: RX ONLY
End: 2023-06-02

## 2023-06-02 RX ORDER — TESTOSTERONE
POWDER (GRAM) MISCELLANEOUS
Qty: 90 | Refills: 0 | Status: CANCELLED

## 2023-06-02 RX ORDER — PROGESTERONE 100 %
POWDER (GRAM) MISCELLANEOUS
Qty: 90 | Refills: 0 | Status: CANCELLED

## 2023-06-02 RX ORDER — PROGESTERONE 100 %
POWDER (GRAM) MISCELLANEOUS
Qty: 90 | Refills: 1 | Status: CANCELLED

## 2023-08-26 ENCOUNTER — RX ONLY (OUTPATIENT)
Age: 48
Setting detail: RX ONLY
End: 2023-08-26

## 2023-08-26 RX ORDER — PROGESTERONE 100 %
POWDER (GRAM) MISCELLANEOUS
Qty: 90 | Refills: 1 | Status: ERX

## 2023-08-28 ENCOUNTER — RX ONLY (OUTPATIENT)
Age: 48
Setting detail: RX ONLY
End: 2023-08-28

## 2023-08-28 ENCOUNTER — APPOINTMENT (RX ONLY)
Dept: URBAN - METROPOLITAN AREA CLINIC 154 | Facility: CLINIC | Age: 48
Setting detail: DERMATOLOGY
End: 2023-08-28

## 2023-08-28 VITALS
HEART RATE: 68 BPM | HEIGHT: 64 IN | DIASTOLIC BLOOD PRESSURE: 74 MMHG | WEIGHT: 134 LBS | SYSTOLIC BLOOD PRESSURE: 100 MMHG | OXYGEN SATURATION: 98 %

## 2023-08-28 DIAGNOSIS — Z41.9 ENCOUNTER FOR PROCEDURE FOR PURPOSES OTHER THAN REMEDYING HEALTH STATE, UNSPECIFIED: ICD-10-CM

## 2023-08-28 PROCEDURE — ? OTHER

## 2023-08-28 RX ORDER — TESTOSTERONE
POWDER (GRAM) MISCELLANEOUS
Qty: 90 | Refills: 0 | Status: ERX

## 2023-08-28 RX ORDER — PROGESTERONE 100 %
POWDER (GRAM) MISCELLANEOUS
Qty: 90 | Refills: 1 | Status: ERX

## 2023-08-28 RX ORDER — LEVOTHYROXINE, LIOTHYRONINE 57; 13.5 UG/1; UG/1
TABLET ORAL
Qty: 90 | Refills: 1 | Status: ERX

## 2023-08-28 RX ORDER — SPIRONOLACTONE 50 MG/1
TABLET, FILM COATED ORAL
Qty: 180 | Refills: 1 | Status: ERX

## 2023-08-28 RX ORDER — LEVOTHYROXINE, LIOTHYRONINE 57; 13.5 UG/1; UG/1
TABLET ORAL
Qty: 90 | Refills: 1 | Status: CANCELLED
Stop reason: SDUPTHER

## 2023-08-29 ENCOUNTER — APPOINTMENT (RX ONLY)
Dept: URBAN - METROPOLITAN AREA CLINIC 154 | Facility: CLINIC | Age: 48
Setting detail: DERMATOLOGY
End: 2023-08-29

## 2023-08-29 DIAGNOSIS — Z41.9 ENCOUNTER FOR PROCEDURE FOR PURPOSES OTHER THAN REMEDYING HEALTH STATE, UNSPECIFIED: ICD-10-CM

## 2023-08-29 PROCEDURE — ? DERMAPLANE

## 2023-08-29 PROCEDURE — ? HYDRAFACIAL

## 2023-08-29 ASSESSMENT — LOCATION SIMPLE DESCRIPTION DERM
LOCATION SIMPLE: RIGHT ANTERIOR NECK
LOCATION SIMPLE: RIGHT CHEEK

## 2023-08-29 ASSESSMENT — LOCATION ZONE DERM
LOCATION ZONE: FACE
LOCATION ZONE: NECK

## 2023-08-29 NOTE — PROCEDURE: DERMAPLANE
Detail Level: Zone
Blade: 10 blade scalpel
Treatment Area Prep: acetone
Pre-Procedure Text: The patient was placed in a recumbant position on the procedure table.
Post-Care Instructions: I reviewed with the patient in detail post-care instructions.
Post-Procedure Instructions: Following the dermaplane procedure, Moisturizer and SPF was applied.
Treatment Areas: face and neck

## 2023-08-29 NOTE — PROCEDURE: HYDRAFACIAL
Solution Override: Lip Perk Solution
Vacuum Pressure Low Setting (Will Not Render If Set To 0): 22
Tip: Hydropeel Tip, Clear
Vacuum Pressure High Setting (Will Not Render If Set To 0): 0
Number Of Passes: 1
Solution: GlySal 15%
Procedure: Extraction
Tip Override
Consent: Written consent obtained, risks reviewed including but not limited to crusting, scabbing, blistering, scarring, darker or lighter pigmentary change, bruising, and/or incomplete response.
Vacuum Pressure Low Setting (Will Not Render If Set To 0): 13
Tip: Hydropeel Tip, Teal
Post-Care Instructions: I reviewed with the patient in detail post-care instructions. Patient should stay away from the sun and wear sun protection until treated areas are fully healed.
Solution: Activ-4
Procedure: Peel
Tip Override: Lip Perk Hydropeel Tip
Location: face
Vacuum Pressure Low Setting (Will Not Render If Set To 0): 12
Solution Override
Procedure: Exfoliation
Vacuum Pressure Low Setting (Will Not Render If Set To 0): 22
Number Of Passes: 2
Vacuum Pressure Low Setting (Will Not Render If Set To 0): 13
Indication: anti-aging
Solution: GlySal 7.5%
Procedure: Fusion
Tip: Hydropeel Tip, Blue

## 2023-09-26 ENCOUNTER — APPOINTMENT (RX ONLY)
Dept: URBAN - METROPOLITAN AREA CLINIC 154 | Facility: CLINIC | Age: 48
Setting detail: DERMATOLOGY
End: 2023-09-26

## 2023-09-26 DIAGNOSIS — Z41.9 ENCOUNTER FOR PROCEDURE FOR PURPOSES OTHER THAN REMEDYING HEALTH STATE, UNSPECIFIED: ICD-10-CM

## 2023-09-26 PROCEDURE — ? JEUVEAU

## 2023-09-26 PROCEDURE — ? DERMAPLANE

## 2023-09-26 PROCEDURE — ? HYDRAFACIAL

## 2023-09-26 ASSESSMENT — LOCATION DETAILED DESCRIPTION DERM: LOCATION DETAILED: RIGHT INFERIOR CENTRAL MALAR CHEEK

## 2023-09-26 ASSESSMENT — LOCATION ZONE DERM: LOCATION ZONE: FACE

## 2023-09-26 ASSESSMENT — LOCATION SIMPLE DESCRIPTION DERM: LOCATION SIMPLE: RIGHT CHEEK

## 2023-09-26 NOTE — PROCEDURE: HYDRAFACIAL
Solution Override
Vacuum Pressure Low Setting (Will Not Render If Set To 0): 13
Procedure: Extraction
Solution: Activ-4
Vacuum Pressure Low Setting (Will Not Render If Set To 0): 13
Consent: Written consent obtained, risks reviewed including but not limited to crusting, scabbing, blistering, scarring, darker or lighter pigmentary change, bruising, and/or incomplete response.
Procedure: Fusion
Post-Care Instructions: I reviewed with the patient in detail post-care instructions. Patient should stay away from the sun and wear sun protection until treated areas are fully healed.
Vacuum Pressure Low Setting (Will Not Render If Set To 0): 22
Solution: GlySal 15%
Vacuum Pressure High Setting (Will Not Render If Set To 0): 0
Indication: anti-aging
Vacuum Pressure Low Setting (Will Not Render If Set To 0): 22
Solution: GlySal 7.5%
Solution Override: Lip Perk Solution
Vacuum Pressure Low Setting (Will Not Render If Set To 0): 12
Number Of Passes: 1
Tip: Hydropeel Tip, Blue
Tip Override
Tip: Hydropeel Tip, Teal
Number Of Passes: 2
Procedure: Exfoliation
Tip Override: Lip Perk Hydropeel Tip
Tip: Hydropeel Tip, Clear
Procedure: Peel
Location: face

## 2023-09-26 NOTE — PROCEDURE: DERMAPLANE
Post-Procedure Instructions: Following the dermaplane procedure, Moisturizer and SPF was applied.
Treatment Areas: face
Blade: 10 blade scalpel
Post-Care Instructions: I reviewed with the patient in detail post-care instructions.
Treatment Area Prep: acetone
Detail Level: Zone
Pre-Procedure Text: The patient was placed in a recumbant position on the procedure table.

## 2023-09-26 NOTE — PROCEDURE: JEUVEAU
Additional Area 2 Units: 0
Show Glabellar Units: Yes
Show Ucl Units: No
Additional Area 1 Location: masseters
Expiration Date (Month Year): 4/23
Forehead Units: 5
Consent: Written consent obtained. Risks include but not limited to lid/brow ptosis, bruising, swelling, diplopia, temporary effect, incomplete chemical denervation.
Lot #: [id number]
Glabellar Complex Units: 15
Detail Level: Detailed
Dilution (U/0.1 Cc): 4
Post-Care Instructions: Patient instructed to not lie down for 4 hours and limit physical activity for 24 hours.

## 2023-12-05 ENCOUNTER — RX ONLY (OUTPATIENT)
Age: 48
Setting detail: RX ONLY
End: 2023-12-05

## 2023-12-05 RX ORDER — TESTOSTERONE
POWDER (GRAM) MISCELLANEOUS
Qty: 90 | Refills: 0 | Status: ERX

## 2023-12-06 ENCOUNTER — APPOINTMENT (RX ONLY)
Dept: URBAN - METROPOLITAN AREA CLINIC 154 | Facility: CLINIC | Age: 48
Setting detail: DERMATOLOGY
End: 2023-12-06

## 2023-12-06 ENCOUNTER — RX ONLY (OUTPATIENT)
Age: 48
Setting detail: RX ONLY
End: 2023-12-06

## 2023-12-06 DIAGNOSIS — Z41.9 ENCOUNTER FOR PROCEDURE FOR PURPOSES OTHER THAN REMEDYING HEALTH STATE, UNSPECIFIED: ICD-10-CM

## 2023-12-06 PROCEDURE — ? JEUVEAU

## 2023-12-06 RX ORDER — FLUOROURACIL 5 MG/G
CREAM TOPICAL
Qty: 40 | Refills: 0 | Status: ERX | COMMUNITY
Start: 2023-12-06

## 2023-12-06 NOTE — PROCEDURE: JEUVEAU
Show Additional Area 3: Yes
[facility] Units: 0
Detail Level: Detailed
Expiration Date (Month Year): 10/24
Anterior Platysmal Bands Units: 25
Show Ucl Units: No
Consent: Written consent obtained. Risks include but not limited to lid/brow ptosis, bruising, swelling, diplopia, temporary effect, incomplete chemical denervation.
Post-Care Instructions: Patient instructed to not lie down for 4 hours and limit physical activity for 24 hours.
Inferior Lateral Orbicularis Oculi Units: 15
Depressor Anguli Oris Units: 5
Dilution (U/0.1 Cc): 4

## 2024-02-26 ENCOUNTER — APPOINTMENT (RX ONLY)
Dept: URBAN - METROPOLITAN AREA CLINIC 154 | Facility: CLINIC | Age: 49
Setting detail: DERMATOLOGY
End: 2024-02-26

## 2024-02-26 VITALS
DIASTOLIC BLOOD PRESSURE: 74 MMHG | HEIGHT: 64 IN | OXYGEN SATURATION: 98 % | HEART RATE: 77 BPM | SYSTOLIC BLOOD PRESSURE: 120 MMHG | WEIGHT: 164 LBS

## 2024-02-26 DIAGNOSIS — Z41.9 ENCOUNTER FOR PROCEDURE FOR PURPOSES OTHER THAN REMEDYING HEALTH STATE, UNSPECIFIED: ICD-10-CM

## 2024-02-26 PROCEDURE — ? OTHER

## 2024-03-04 ENCOUNTER — RX ONLY (OUTPATIENT)
Age: 49
Setting detail: RX ONLY
End: 2024-03-04

## 2024-03-04 RX ORDER — TESTOSTERONE
POWDER (GRAM) MISCELLANEOUS
Qty: 90 | Refills: 0 | Status: ERX

## 2024-03-04 RX ORDER — LEVOTHYROXINE, LIOTHYRONINE 57; 13.5 UG/1; UG/1
TABLET ORAL
Qty: 90 | Refills: 1 | Status: ERX

## 2024-03-04 RX ORDER — PROGESTERONE 100 %
POWDER (GRAM) MISCELLANEOUS
Qty: 90 | Refills: 1 | Status: ERX

## 2024-03-25 ENCOUNTER — APPOINTMENT (RX ONLY)
Dept: URBAN - METROPOLITAN AREA CLINIC 154 | Facility: CLINIC | Age: 49
Setting detail: DERMATOLOGY
End: 2024-03-25

## 2024-03-25 VITALS — HEIGHT: 64 IN | WEIGHT: 165 LBS

## 2024-04-22 ENCOUNTER — APPOINTMENT (RX ONLY)
Dept: URBAN - METROPOLITAN AREA CLINIC 154 | Facility: CLINIC | Age: 49
Setting detail: DERMATOLOGY
End: 2024-04-22

## 2024-06-11 ENCOUNTER — RX ONLY (OUTPATIENT)
Age: 49
Setting detail: RX ONLY
End: 2024-06-11

## 2024-06-11 RX ORDER — TESTOSTERONE
POWDER (GRAM) MISCELLANEOUS
Qty: 90 | Refills: 0 | Status: ERX

## 2024-06-19 ENCOUNTER — APPOINTMENT (RX ONLY)
Dept: URBAN - METROPOLITAN AREA CLINIC 154 | Facility: CLINIC | Age: 49
Setting detail: DERMATOLOGY
End: 2024-06-19

## 2024-06-19 VITALS — WEIGHT: 158 LBS | HEIGHT: 64 IN

## 2024-06-19 DIAGNOSIS — Z41.9 ENCOUNTER FOR PROCEDURE FOR PURPOSES OTHER THAN REMEDYING HEALTH STATE, UNSPECIFIED: ICD-10-CM

## 2024-06-19 PROCEDURE — ? OTHER

## 2024-08-23 ENCOUNTER — RX ONLY (OUTPATIENT)
Age: 49
Setting detail: RX ONLY
End: 2024-08-23

## 2024-08-23 RX ORDER — LEVOTHYROXINE, LIOTHYRONINE 57; 13.5 UG/1; UG/1
TABLET ORAL
Qty: 90 | Refills: 1 | Status: ERX

## 2024-08-26 ENCOUNTER — APPOINTMENT (RX ONLY)
Dept: URBAN - METROPOLITAN AREA CLINIC 154 | Facility: CLINIC | Age: 49
Setting detail: DERMATOLOGY
End: 2024-08-26

## 2024-08-26 VITALS
HEART RATE: 80 BPM | OXYGEN SATURATION: 98 % | DIASTOLIC BLOOD PRESSURE: 80 MMHG | HEIGHT: 64 IN | WEIGHT: 163 LBS | SYSTOLIC BLOOD PRESSURE: 120 MMHG

## 2024-08-26 DIAGNOSIS — Z41.9 ENCOUNTER FOR PROCEDURE FOR PURPOSES OTHER THAN REMEDYING HEALTH STATE, UNSPECIFIED: ICD-10-CM

## 2024-08-26 PROCEDURE — ? OTHER

## 2024-09-10 ENCOUNTER — RX ONLY (OUTPATIENT)
Age: 49
Setting detail: RX ONLY
End: 2024-09-10

## 2024-09-10 RX ORDER — PROGESTERONE 100 %
POWDER (GRAM) MISCELLANEOUS
Qty: 90 | Refills: 1 | Status: ERX

## 2024-09-30 ENCOUNTER — APPOINTMENT (RX ONLY)
Dept: URBAN - METROPOLITAN AREA CLINIC 154 | Facility: CLINIC | Age: 49
Setting detail: DERMATOLOGY
End: 2024-09-30

## 2024-09-30 VITALS
WEIGHT: 160 LBS | DIASTOLIC BLOOD PRESSURE: 80 MMHG | SYSTOLIC BLOOD PRESSURE: 120 MMHG | OXYGEN SATURATION: 98 % | HEART RATE: 86 BPM | HEIGHT: 64 IN

## 2024-09-30 DIAGNOSIS — Z41.9 ENCOUNTER FOR PROCEDURE FOR PURPOSES OTHER THAN REMEDYING HEALTH STATE, UNSPECIFIED: ICD-10-CM

## 2024-09-30 PROCEDURE — ? OTHER

## 2024-10-28 ENCOUNTER — APPOINTMENT (RX ONLY)
Dept: URBAN - METROPOLITAN AREA CLINIC 154 | Facility: CLINIC | Age: 49
Setting detail: DERMATOLOGY
End: 2024-10-28

## 2024-10-28 VITALS
DIASTOLIC BLOOD PRESSURE: 74 MMHG | SYSTOLIC BLOOD PRESSURE: 120 MMHG | WEIGHT: 155 LBS | HEART RATE: 95 BPM | OXYGEN SATURATION: 99 % | HEIGHT: 64 IN

## 2024-10-28 DIAGNOSIS — Z41.9 ENCOUNTER FOR PROCEDURE FOR PURPOSES OTHER THAN REMEDYING HEALTH STATE, UNSPECIFIED: ICD-10-CM

## 2024-10-28 PROCEDURE — ? OTHER

## 2024-10-28 NOTE — PROCEDURE: OTHER
Other (Free Text): Jose came in to continue with weight loss. She weighed 155 down 5lbs. Feels great. Follow up 1 month. Jose stated she has been off of testosterone injections and just feels like she needs to get back on but would like to try cream. [place]. Maybe try 1 month to see if that’s what she needs. She will let us know if she wishes to continue with testosterone cream.  Nu Jackson Ma
Detail Level: Zone
Render Risk Assessment In Note?: no
Note Text (......Xxx Chief Complaint.): This diagnosis correlates with the

## 2024-11-02 ENCOUNTER — RX ONLY (OUTPATIENT)
Age: 49
Setting detail: RX ONLY
End: 2024-11-02

## 2024-11-02 RX ORDER — TESTOSTERONE
POWDER (GRAM) MISCELLANEOUS
Qty: 30 | Refills: 0 | Status: ERX

## 2024-12-02 ENCOUNTER — APPOINTMENT (RX ONLY)
Dept: URBAN - METROPOLITAN AREA CLINIC 154 | Facility: CLINIC | Age: 49
Setting detail: DERMATOLOGY
End: 2024-12-02

## 2024-12-02 DIAGNOSIS — Z41.9 ENCOUNTER FOR PROCEDURE FOR PURPOSES OTHER THAN REMEDYING HEALTH STATE, UNSPECIFIED: ICD-10-CM

## 2024-12-02 PROCEDURE — ? OTHER

## 2024-12-02 NOTE — PROCEDURE: OTHER
Detail Level: Zone
Note Text (......Xxx Chief Complaint.): This diagnosis correlates with the
Other (Free Text): Jose came in today to continue with weight loss. She weighed 157. Up 2lbs. She is having a little nausea when she increases her dose. She is walking for exercise because of her back she has to be careful. Follow up in 1 month to continue.  Nu Jackson MA
Render Risk Assessment In Note?: no

## 2025-02-26 ENCOUNTER — APPOINTMENT (OUTPATIENT)
Dept: URBAN - METROPOLITAN AREA CLINIC 154 | Facility: CLINIC | Age: 50
Setting detail: DERMATOLOGY
End: 2025-02-26

## 2025-02-26 VITALS
WEIGHT: 159 LBS | HEIGHT: 64 IN | SYSTOLIC BLOOD PRESSURE: 130 MMHG | HEART RATE: 73 BPM | DIASTOLIC BLOOD PRESSURE: 80 MMHG | OXYGEN SATURATION: 99 %

## 2025-02-26 DIAGNOSIS — Z41.9 ENCOUNTER FOR PROCEDURE FOR PURPOSES OTHER THAN REMEDYING HEALTH STATE, UNSPECIFIED: ICD-10-CM

## 2025-02-26 PROCEDURE — ? OTHER

## 2025-03-04 ENCOUNTER — RX ONLY (RX ONLY)
Age: 50
End: 2025-03-04

## 2025-03-04 RX ORDER — PROGESTERONE 100 %
POWDER (GRAM) MISCELLANEOUS
Qty: 90 | Refills: 1 | Status: ERX

## 2025-03-04 RX ORDER — LEVOTHYROXINE, LIOTHYRONINE 57; 13.5 UG/1; UG/1
TABLET ORAL
Qty: 90 | Refills: 1 | Status: ERX

## 2025-08-25 ENCOUNTER — APPOINTMENT (OUTPATIENT)
Dept: URBAN - METROPOLITAN AREA CLINIC 154 | Facility: CLINIC | Age: 50
Setting detail: DERMATOLOGY
End: 2025-08-25

## 2025-08-25 ENCOUNTER — RX ONLY (RX ONLY)
Age: 50
End: 2025-08-25

## 2025-08-25 VITALS
SYSTOLIC BLOOD PRESSURE: 120 MMHG | HEIGHT: 64 IN | DIASTOLIC BLOOD PRESSURE: 80 MMHG | WEIGHT: 158 LBS | HEART RATE: 76 BPM | OXYGEN SATURATION: 97 %

## 2025-08-25 DIAGNOSIS — Z41.9 ENCOUNTER FOR PROCEDURE FOR PURPOSES OTHER THAN REMEDYING HEALTH STATE, UNSPECIFIED: ICD-10-CM

## 2025-08-25 PROCEDURE — ? OTHER

## 2025-08-25 RX ORDER — TESTOSTERONE
POWDER (GRAM) MISCELLANEOUS
Qty: 30 | Refills: 0 | Status: ERX

## 2025-09-02 ENCOUNTER — RX ONLY (RX ONLY)
Age: 50
End: 2025-09-02

## 2025-09-02 RX ORDER — PROGESTERONE 100 %
POWDER (GRAM) MISCELLANEOUS
Qty: 90 | Refills: 1 | Status: ERX